# Patient Record
Sex: MALE | Race: WHITE | NOT HISPANIC OR LATINO | Employment: OTHER | URBAN - NONMETROPOLITAN AREA
[De-identification: names, ages, dates, MRNs, and addresses within clinical notes are randomized per-mention and may not be internally consistent; named-entity substitution may affect disease eponyms.]

---

## 2019-08-14 PROCEDURE — 99283 EMERGENCY DEPT VISIT LOW MDM: CPT

## 2019-08-15 ENCOUNTER — APPOINTMENT (OUTPATIENT)
Dept: CT IMAGING | Facility: HOSPITAL | Age: 59
End: 2019-08-15

## 2019-08-15 ENCOUNTER — APPOINTMENT (OUTPATIENT)
Dept: GENERAL RADIOLOGY | Facility: HOSPITAL | Age: 59
End: 2019-08-15

## 2019-08-15 ENCOUNTER — HOSPITAL ENCOUNTER (EMERGENCY)
Facility: HOSPITAL | Age: 59
Discharge: HOME | End: 2019-08-15
Attending: EMERGENCY MEDICINE | Admitting: EMERGENCY MEDICINE

## 2019-08-15 VITALS
BODY MASS INDEX: 32.28 KG/M2 | TEMPERATURE: 98 F | SYSTOLIC BLOOD PRESSURE: 137 MMHG | DIASTOLIC BLOOD PRESSURE: 76 MMHG | HEART RATE: 76 BPM | WEIGHT: 213 LBS | OXYGEN SATURATION: 92 % | HEIGHT: 68 IN | RESPIRATION RATE: 20 BRPM

## 2019-08-15 DIAGNOSIS — S61.221A LACERATION OF LEFT INDEX FINGER WITH FOREIGN BODY WITHOUT DAMAGE TO NAIL, INITIAL ENCOUNTER: ICD-10-CM

## 2019-08-15 DIAGNOSIS — T07.XXXA MULTIPLE CONTUSIONS: ICD-10-CM

## 2019-08-15 DIAGNOSIS — S42.102A CLOSED FRACTURE OF LEFT SCAPULA, UNSPECIFIED PART OF SCAPULA, INITIAL ENCOUNTER: Primary | ICD-10-CM

## 2019-08-15 DIAGNOSIS — S06.0X1A CONCUSSION WITH LOSS OF CONSCIOUSNESS OF 30 MINUTES OR LESS, INITIAL ENCOUNTER: ICD-10-CM

## 2019-08-15 PROCEDURE — 25010000002 TDAP 5-2.5-18.5 LF-MCG/0.5 SUSPENSION: Performed by: EMERGENCY MEDICINE

## 2019-08-15 PROCEDURE — 74176 CT ABD & PELVIS W/O CONTRAST: CPT

## 2019-08-15 PROCEDURE — 73590 X-RAY EXAM OF LOWER LEG: CPT

## 2019-08-15 PROCEDURE — 90471 IMMUNIZATION ADMIN: CPT | Performed by: EMERGENCY MEDICINE

## 2019-08-15 PROCEDURE — 72128 CT CHEST SPINE W/O DYE: CPT

## 2019-08-15 PROCEDURE — 72131 CT LUMBAR SPINE W/O DYE: CPT

## 2019-08-15 PROCEDURE — 73130 X-RAY EXAM OF HAND: CPT

## 2019-08-15 PROCEDURE — 73502 X-RAY EXAM HIP UNI 2-3 VIEWS: CPT

## 2019-08-15 PROCEDURE — 71250 CT THORAX DX C-: CPT

## 2019-08-15 PROCEDURE — 73564 X-RAY EXAM KNEE 4 OR MORE: CPT

## 2019-08-15 PROCEDURE — 90715 TDAP VACCINE 7 YRS/> IM: CPT | Performed by: EMERGENCY MEDICINE

## 2019-08-15 PROCEDURE — 72125 CT NECK SPINE W/O DYE: CPT

## 2019-08-15 PROCEDURE — 73030 X-RAY EXAM OF SHOULDER: CPT

## 2019-08-15 PROCEDURE — 70450 CT HEAD/BRAIN W/O DYE: CPT

## 2019-08-15 RX ORDER — HYDROCODONE BITARTRATE AND ACETAMINOPHEN 5; 325 MG/1; MG/1
1 TABLET ORAL EVERY 6 HOURS PRN
Qty: 12 TABLET | Refills: 0 | Status: SHIPPED | OUTPATIENT
Start: 2019-08-15 | End: 2019-08-19 | Stop reason: SDUPTHER

## 2019-08-15 RX ORDER — HYDROCODONE BITARTRATE AND ACETAMINOPHEN 10; 325 MG/1; MG/1
1 TABLET ORAL ONCE
Status: COMPLETED | OUTPATIENT
Start: 2019-08-15 | End: 2019-08-15

## 2019-08-15 RX ADMIN — HYDROCODONE BITARTRATE AND ACETAMINOPHEN 1 TABLET: 10; 325 TABLET ORAL at 02:26

## 2019-08-15 RX ADMIN — TETANUS TOXOID, REDUCED DIPHTHERIA TOXOID AND ACELLULAR PERTUSSIS VACCINE, ADSORBED 0.5 ML: 5; 2.5; 8; 8; 2.5 SUSPENSION INTRAMUSCULAR at 02:27

## 2019-08-15 NOTE — DISCHARGE INSTRUCTIONS
Please return with new or worsening symptoms.  Follow-up with orthopedics.  He gets medication filled and take as directed as needed.  Medication may be addictive and may cause drowsiness.  Only take as directed and do not drive or operate machinery while taking this medication.
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
## 2019-08-16 ENCOUNTER — OFFICE VISIT (OUTPATIENT)
Dept: ORTHOPEDIC SURGERY | Facility: CLINIC | Age: 59
End: 2019-08-16

## 2019-08-16 VITALS — BODY MASS INDEX: 32.28 KG/M2 | HEIGHT: 68 IN | WEIGHT: 213 LBS

## 2019-08-16 DIAGNOSIS — M25.512 LEFT SHOULDER PAIN, UNSPECIFIED CHRONICITY: Primary | ICD-10-CM
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
## 2019-08-16 DIAGNOSIS — S42.112A CLOSED DISPLACED FRACTURE OF BODY OF LEFT SCAPULA, INITIAL ENCOUNTER: ICD-10-CM

## 2019-08-16 PROCEDURE — 99203 OFFICE O/P NEW LOW 30 MIN: CPT | Performed by: ORTHOPAEDIC SURGERY

## 2019-08-16 NOTE — PROGRESS NOTES
Crow Moffett is a 59 y.o. male   Primary provider:  Provider, No Known       Chief Complaint   Patient presents with   • Left Shoulder - Pain       HISTORY OF PRESENT ILLNESS: Patient is here today for left shoulder pain. Patient fell off a 20-30 foot scaffolding on 8/14/2019. Patient was seen at [facility] ER and xrays were obtained. Patient states that his pain today is 8/10.  He was seen in the ER and had multiple x-rays and CT scans and noted to have a scapular fracture.  He said previous injuries to the left arm take care of by Dr. Ching had ORIF of his distal humerus apparently had some nerve injury is had some fingers fused in his left hand and he is essentially has a helping him.  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    History of Present Illness     CONCURRENT MEDICAL HISTORY:    Past Medical History:   Diagnosis Date   • Acute maxillary sinusitis    • Closed fracture of distal end of ulna    • Closed fracture of humerus    • Edema of upper extremity     left upper arm with redness    • Hoarse    • Polyuria    • Shoulder pain    • Slow urinary stream        No Known Allergies      Current Outpatient Medications:   •  HYDROcodone-acetaminophen (NORCO) 5-325 MG per tablet, Take 1 tablet by mouth Every 6 (Six) Hours As Needed for Moderate Pain  for up to 3 days., Disp: 12 tablet, Rfl: 0  •  [START ON 11/27/2106] ipratropium-albuterol (DUO-NEB) 0.5-2.5 mg/mL nebulizer, Take 3 mL by nebulization Every 4 (Four) Hours As Needed for wheezing., Disp: , Rfl:     Past Surgical History:   Procedure Laterality Date   • HUMERUS SURGERY  05/30/2013    Anesth, humerus surgery (OPen reduction and internal fixation with ulnar nerve transposition with triceps reflection. Procedure done under fluoroscopic control; final x-rays AP and oblique of the elbow maintained in medical record.)   • HUMERUS SURGERY  05/11/2013    Anesth, humerus surgery (Left humerus open reduction  "and internal fixation. Fractured right ulna distal shaft. fractured left humeral shaft.Procedure done under fluoroscopic control with multiple x-rays, fluoroscopy throughout the procedure.)   • INJECTION OF MEDICATION  08/12/2010    Kenalog (1)      • OTHER SURGICAL HISTORY  02/27/2014    Anesth, elbow area surgery (Removal of wire, screw and washer of the elbow. ECRB pronator teres transfer. Long & ring sublimis transfers to extensor tendons of thumb, index, long, ring & small-ring sublimis to long, ring & small fingers & long sublimis to thumb & index.)       No family history on file.     Social History     Socioeconomic History   • Marital status:      Spouse name: Not on file   • Number of children: Not on file   • Years of education: Not on file   • Highest education level: Not on file   Tobacco Use   • Smoking status: Never Smoker   Substance and Sexual Activity   • Alcohol use: Yes        Review of Systems   Constitutional: Negative.  Negative for chills and fever.   HENT: Positive for hearing loss. Negative for facial swelling.    Eyes: Negative.  Negative for photophobia.   Respiratory: Positive for shortness of breath. Negative for apnea.    Cardiovascular: Negative.  Negative for chest pain and leg swelling.   Gastrointestinal: Negative.  Negative for abdominal pain, nausea and vomiting.   Endocrine: Negative.    Genitourinary: Positive for hematuria. Negative for dysuria.   Musculoskeletal: Negative.    Skin: Negative.  Negative for color change and rash.   Allergic/Immunologic: Negative.    Neurological: Negative.  Negative for seizures and syncope.   Hematological: Negative.    Psychiatric/Behavioral: Positive for sleep disturbance. Negative for behavioral problems and dysphoric mood.       PHYSICAL EXAMINATION:       Ht 172.7 cm (68\")   Wt 96.6 kg (213 lb)   BMI 32.39 kg/m²     Physical Exam   Constitutional: He is oriented to person, place, and time. He appears well-developed. No distress. "   HENT:   Head: Normocephalic.   Bruising ecchymosis on the left side of his head and forehead   Eyes: EOM are normal. Pupils are equal, round, and reactive to light.   Neck: Neck supple. No tracheal deviation present.   Pulmonary/Chest: Effort normal.   Musculoskeletal: He exhibits tenderness. He exhibits no edema or deformity.   Neurological: He is alert and oriented to person, place, and time. A sensory deficit is present.   Skin: Skin is warm and dry. No erythema.   Psychiatric: He has a normal mood and affect.       GAIT:     []  Normal  [x]  Antalgic    Assistive device: []  None  []  Walker     []  Crutches  []  Cane     []  Wheelchair  []  Stretcher    Ortho Exam  Well-healed scars very decreased motion of the shoulder secondary to pain some numbness in his fingers unable to make a fist ( chronic problem) very tender and bruising about the scapula motion is decreased    Ct Abdomen Pelvis Without Contrast    Result Date: 8/15/2019  Narrative: CT chest, abdomen and pelvis without contrast on 8/15/2019 CLINICAL INDICATION: Fell 20 feet, generalized abdominal pain, per protocol for mechanism of injury TECHNIQUE: Multiple axial images are obtained throughout the chest, abdomen and pelvis without the administration of contrast. This exam was performed according to our departmental dose-optimization program, which includes automated exposure control, adjustment of the mA and/or kV according to patient size and/or use of iterative reconstruction technique. Total DLP is 921 mGy*cm. COMPARISON: Chest CT from 11/27/2016 FINDINGS: Of note, evaluation of a trauma patient without contrast is limited. CHEST: Mild coronary artery calcifications and other vascular calcifications are noted. There is elevation of the left hemidiaphragm. There is trace right pleural effusion. There is no left pleural effusion or pericardial effusion. There is mild bibasilar atelectasis. The lungs are otherwise clear. There is no thoracic  adenopathy. There is an acute, oblique, displaced fracture through the left upper body of the scapula. Screws are noted in the left proximal humerus. Old left rib fractures are noted. No other acute bony abnormality of the thorax is noted. ABDOMEN: There are nonobstructing left renal stones. The unenhanced solid abdominal organs are otherwise unremarkable. There is no abdominal adenopathy. Vascular calcifications are noted. There is no free fluid or free air within the abdomen. There is diverticulosis. The abdominal portion of the GI tract is otherwise unremarkable. Pelvis: There is mild diverticulosis. There is no free fluid in the pelvis. There are small left greater than right inguinal hernias containing only fat. There is no pelvic adenopathy. Intrahepatic portion of the GI tract including the appendix is otherwise unremarkable. No acute bony abnormality is noted.     Impression: 1. Acute displaced left scapular fracture. 2. No other evidence of acute traumatic injury in the chest, abdomen or pelvis by unenhanced imaging. Electronically signed by:  Jose M Mirza  8/15/2019 1:55 AM CDT Workstation: [id number]    Xr Hand 3+ View Left    Result Date: 8/15/2019  Narrative: Left hand three view on 8/15/2019 CLINICAL INDICATION: Fell 20 feet, pain, laceration COMPARISON: None FINDINGS: There is diffuse osteopenia. There is an old healed fracture of the distal radius. There is no radiopaque foreign body. There are no acute fractures. There is no dislocation.     Impression: No acute bony abnormality. Electronically signed by:  Jose M Mirza  8/15/2019 1:44 AM CDT Workstation: [id number]    Xr Knee 4+ View Left    Result Date: 8/15/2019  Narrative: Left knee four view on 8/15/2019 CLINICAL INDICATION: Fell 20 feet, pain COMPARISON: None FINDINGS: No joint effusion is noted. There are no fractures. Visualized joints are well aligned. No bony abnormality is noted.     Impression: No acute abnormality. Electronically  signed by:  Jose M Mirza  8/15/2019 1:46 AM CDT Workstation: [id number]    Xr Tibia Fibula 2 View Left    Result Date: 8/15/2019  Narrative: Left tibia fibula two view on 8/15/2019 CLINICAL INDICATION: Fell 20 feet, pain, laceration COMPARISON: None FINDINGS: There is old cerclage wire fixation in the distal fibula fixating an old healed fracture. There is no radiopaque foreign body. There are no acute fractures. Visualized joints are well aligned.     Impression: No acute abnormality. Electronically signed by:  Jose M Mirza  8/15/2019 1:45 AM CDT Workstation: [id number]    Ct Head Without Contrast    Result Date: 8/15/2019  Narrative: CT head without contrast on 8/15/2019 CLINICAL INDICATION: Fell 20 feet, bruising over left eye, concussion, pain TECHNIQUE: Multiple axial images are obtained throughout the head without the administration of contrast. This exam was performed according to our departmental dose-optimization program, which includes automated exposure control, adjustment of the mA and/or kV according to patient size and/or use of iterative reconstruction technique. Total DLP is 971.1 mGy*cm. COMPARISON: 10/9/2016 FINDINGS: There is no hydrocephalus. There is no CT evidence of acute infarct. There is no hemorrhage. There are no abnormal extra-axial fluid collections. There is no mass, mass effect or midline shift. There is a large likely mucous retention cyst in the left maxillary sinus. There is partial opacification of the right greater than left ethmoid sinuses. No bony abnormality is noted.     Impression: No acute intracranial abnormality. Electronically signed by:  Jose M Mirza  8/15/2019 1:14 AM CDT Workstation: [id number]    Ct Chest Without Contrast    Result Date: 8/15/2019  Narrative: CT chest, abdomen and pelvis without contrast on 8/15/2019 CLINICAL INDICATION: Fell 20 feet, generalized abdominal pain, per protocol for mechanism of injury TECHNIQUE: Multiple axial images are obtained  throughout the chest, abdomen and pelvis without the administration of contrast. This exam was performed according to our departmental dose-optimization program, which includes automated exposure control, adjustment of the mA and/or kV according to patient size and/or use of iterative reconstruction technique. Total DLP is 921 mGy*cm. COMPARISON: Chest CT from 11/27/2016 FINDINGS: Of note, evaluation of a trauma patient without contrast is limited. CHEST: Mild coronary artery calcifications and other vascular calcifications are noted. There is elevation of the left hemidiaphragm. There is trace right pleural effusion. There is no left pleural effusion or pericardial effusion. There is mild bibasilar atelectasis. The lungs are otherwise clear. There is no thoracic adenopathy. There is an acute, oblique, displaced fracture through the left upper body of the scapula. Screws are noted in the left proximal humerus. Old left rib fractures are noted. No other acute bony abnormality of the thorax is noted. ABDOMEN: There are nonobstructing left renal stones. The unenhanced solid abdominal organs are otherwise unremarkable. There is no abdominal adenopathy. Vascular calcifications are noted. There is no free fluid or free air within the abdomen. There is diverticulosis. The abdominal portion of the GI tract is otherwise unremarkable. Pelvis: There is mild diverticulosis. There is no free fluid in the pelvis. There are small left greater than right inguinal hernias containing only fat. There is no pelvic adenopathy. Intrahepatic portion of the GI tract including the appendix is otherwise unremarkable. No acute bony abnormality is noted.     Impression: 1. Acute displaced left scapular fracture. 2. No other evidence of acute traumatic injury in the chest, abdomen or pelvis by unenhanced imaging. Electronically signed by:  Jose M Mirza  8/15/2019 1:55 AM CDT Workstation: [id number]    Ct Cervical Spine Without  Contrast    Result Date: 8/15/2019  Narrative: CT cervical spine without contrast on 8/15/2019 CLINICAL INDICATION: Fell 20 feet, pain TECHNIQUE: Multiple axial images are obtained throughout the cervical spine without the administration of contrast. Sagittal and coronal reformatted images are also performed and reviewed. This exam was performed according to our departmental dose-optimization program, which includes automated exposure control, adjustment of the mA and/or kV according to patient size and/or use of iterative reconstruction technique. Total DLP is 403.3 mGy*cm. COMPARISON: None FINDINGS: There is reversal of the normal cervical lordosis. Degenerative facet disease is noted bilaterally in the upper cervical spine. There is grade 1 spondylolisthesis at C2-3 and C3-4 secondary to degenerative facet disease. Reformatted images reveal otherwise normal alignment of the cervical spine. Bilateral carotid calcifications are noted. There is partial fusion of the left upper ribs to the adjacent vertebral bodies. There are no acute fracture lines. No definite disc herniation is noted. There is no prevertebral soft tissue swelling.     Impression: Degenerative changes with no acute abnormality. Electronically signed by:  Jose M Mirza  8/15/2019 1:17 AM CDT Workstation: [id number]    Ct Thoracic Spine Without Contrast    Result Date: 8/15/2019  Narrative: CT thoracic spine without contrast on  8/15/2019 CLINICAL INDICATION: Pain after fell 20 feet TECHNIQUE: Multiple axial images are obtained throughout the thoracic spine without the administration of contrast. Sagittal and coronal reformatted images are also performed and reviewed. This exam was performed according to our departmental dose-optimization program, which includes automated exposure control, adjustment of the mA and/or kV according to patient size and/or use of iterative reconstruction technique. Total DLP is 1128.6 mGy*cm. COMPARISON: 11/27/2016  FINDINGS: There is stable grade 1 spondylolisthesis at T3-4 secondary to degenerative facet disease. Degenerative disc disease is noted in the upper thoracic spine. Reformatted images reveal otherwise normal alignment of the thoracic spine. Old rib fractures are noted. No acute fracture line is noted. No definite disc herniation is noted.     Impression: No acute abnormality. Electronically signed by:  Jose M Mirza  8/15/2019 1:58 AM CDT Workstation: [id number]    Ct Lumbar Spine Without Contrast    Result Date: 8/15/2019  Narrative: CT lumbar spine without contrast on  8/15/2019 CLINICAL INDICATION: Fell 20 feet, pain TECHNIQUE: Multiple axial images are obtained throughout the lumbar spine without the administration of contrast. Sagittal and coronal reformatted images are also performed and reviewed. This exam was performed according to our departmental dose-optimization program, which includes automated exposure control, adjustment of the mA and/or kV according to patient size and/or use of iterative reconstruction technique. Total DLP is 1112.7 mGy*cm. COMPARISON: None FINDINGS: Mild degenerative disc disease is noted worse in the upper lumbar spine. Reformatted images reveal normal alignment of the lumbar spine. Facet arthropathy is noted in the lower lumbar spine. There are no acute fracture lines. The SI joints are well aligned. There is a nonobstructing left renal stone. Vascular calcifications are noted. There is diverticulosis. There is a trace right pleural effusion. At the L1-2 level, broad-based disc bulge produces minimal canal stenosis. At the L4-5 level, broad-based disc bulge and facet arthropathy produces mild canal stenosis and mild bilateral foraminal narrowing. No definite disc herniations are noted.     Impression: Degenerative changes with no acute fracture or acute malalignment of the lumbar spine. Electronically signed by:  Jose M Mirza  8/15/2019 1:49 AM CDT Workstation:  [id number]    Xr Shoulder 2+ View Bilateral    Result Date: 8/15/2019  Narrative: Bilateral shoulders three view on 8/15/2019 CLINICAL INDICATION: Fell 20 feet, pain COMPARISON: CT on the same day FINDINGS: Left shoulder: Two screws are noted in the proximal humerus. There is partially imaged multiple plate and multiple screw fixation of the mid to distal humerus diaphysis. There has been apparent resection of the left distal clavicle. There is likely acute displaced left scapular fracture. Old rib fractures are noted. No other fracture is noted. Right shoulder: The glenohumeral joint is well located. The AC joint is well aligned. There are no fractures.     Impression: 1. No acute bony abnormality in the right shoulder. 2. Likely acute displaced left scapular fracture. Electronically signed by:  Jose M Mirza  8/15/2019 2:02 AM CDT Workstation: [id number]    Xr Hip With Or Without Pelvis 2 - 3 View Right    Result Date: 8/15/2019  Narrative: Pelvis and right hip total three view on 8/15/2019 CLINICAL INDICATION: Fell 20 feet, pain COMPARISON: CT on the same day FINDINGS: The hips are well located. The SI joints are well aligned. There are no fractures. No significant degenerative changes are noted in the hips.     Impression: No acute abnormality. Electronically signed by:  Jose M Mirza  8/15/2019 1:59 AM CDT Workstation: [id number]          ASSESSMENT:    Diagnoses and all orders for this visit:    Left shoulder pain, unspecified chronicity  -     CT shoulder left wo contrast; Future    Fall, initial encounter  -     CT shoulder left wo contrast; Future    Closed displaced fracture of body of left scapula, initial encounter          PLAN I think we need to get a CT scan of the scapula to evaluate this further.  He seems to have some pre-existing changes in the glenoid not much sure about this.  Most of the time these glenoid fractures or scapular fractures are treated nonoperatively explained this to him we will  see him back after CT scan which is noncontrasted.  He will use a sling for comfort    No Follow-up on file.        This document has been electronically signed by Pete Muñoz MD on August 16, 2019 9:26 AM

## 2019-08-19 ENCOUNTER — DOCUMENTATION (OUTPATIENT)
Dept: ORTHOPEDIC SURGERY | Facility: CLINIC | Age: 59
End: 2019-08-19

## 2019-08-19 RX ORDER — HYDROCODONE BITARTRATE AND ACETAMINOPHEN 5; 325 MG/1; MG/1
1 TABLET ORAL EVERY 6 HOURS PRN
Qty: 40 TABLET | Refills: 0 | Status: SHIPPED | OUTPATIENT
Start: 2019-08-19 | End: 2019-10-18

## 2019-08-19 NOTE — TELEPHONE ENCOUNTER
Patient seen by Dr. Garibay on 8/16/2019 in process of getting ct done. 15 pills given on 8/15/2019 from the ER.

## 2019-08-20 ENCOUNTER — HOSPITAL ENCOUNTER (OUTPATIENT)
Dept: CT IMAGING | Facility: HOSPITAL | Age: 59
Discharge: HOME | End: 2019-08-20
Admitting: ORTHOPAEDIC SURGERY

## 2019-08-20 ENCOUNTER — CLINICAL SUPPORT (OUTPATIENT)
Dept: AUDIOLOGY | Facility: CLINIC | Age: 59
End: 2019-08-20

## 2019-08-20 ENCOUNTER — OFFICE VISIT (OUTPATIENT)
Dept: OTOLARYNGOLOGY | Facility: CLINIC | Age: 59
End: 2019-08-20

## 2019-08-20 VITALS — HEIGHT: 68 IN | TEMPERATURE: 96.6 F | BODY MASS INDEX: 32.28 KG/M2 | WEIGHT: 213 LBS
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
## 2019-08-20 DIAGNOSIS — H68.133: ICD-10-CM

## 2019-08-20 DIAGNOSIS — Z87.828 HISTORY OF MOTOR VEHICLE ACCIDENT: ICD-10-CM

## 2019-08-20 DIAGNOSIS — M25.512 LEFT SHOULDER PAIN, UNSPECIFIED CHRONICITY: ICD-10-CM

## 2019-08-20 DIAGNOSIS — H93.19 TINNITUS, UNSPECIFIED LATERALITY: ICD-10-CM

## 2019-08-20 DIAGNOSIS — H91.8X3 OTHER SPECIFIED HEARING LOSS, BILATERAL: Primary | ICD-10-CM

## 2019-08-20 DIAGNOSIS — H74.01 TYMPANOSCLEROSIS INVOLVING TYMPANIC MEMBRANE ONLY, RIGHT: ICD-10-CM

## 2019-08-20 DIAGNOSIS — J34.2 NASAL SEPTAL DEVIATION: Primary | ICD-10-CM

## 2019-08-20 PROCEDURE — 73200 CT UPPER EXTREMITY W/O DYE: CPT

## 2019-08-20 PROCEDURE — 99204 OFFICE O/P NEW MOD 45 MIN: CPT | Performed by: OTOLARYNGOLOGY

## 2019-08-20 RX ORDER — AZELASTINE 1 MG/ML
2 SPRAY, METERED NASAL 2 TIMES DAILY
Qty: 30 ML | Refills: 12 | Status: SHIPPED | OUTPATIENT
Start: 2019-08-20 | End: 2019-10-22 | Stop reason: HOSPADM

## 2019-08-20 NOTE — PATIENT INSTRUCTIONS

## 2019-08-20 NOTE — PROGRESS NOTES
Subjective   Crow Moffett is a 59 y.o. male.   For ear problem  History of Present Illness   Comes in for an ear problem also has trouble breathing through his nose congestion his ear stopped up his long-standing hearing loss since 1985 had nasal surgery which is failed to improve his deviated septum the left side treated by another surgeon he has postnasal drip and congestion as well.  He had a recent fall has long-standing tinnitus had multiple head injuries was told he may have ruptured eardrum had abnormality of his tympanic membrane on the right comes with hearing loss after the fall but no vertigo      The following portions of the patient's history were reviewed and updated as appropriate: allergies, current medications, past family history, past medical history, past social history, past surgical history and problem list.      Crow Moffett reports that he has never smoked. He does not have any smokeless tobacco history on file. He reports that he drinks alcohol.  Patient is not a tobacco user and has not been counseled for use of tobacco products    History reviewed. No pertinent family history.      Current Outpatient Medications:   •  HYDROcodone-acetaminophen (NORCO) 5-325 MG per tablet, Take 1 tablet by mouth Every 6 (Six) Hours As Needed for Moderate Pain ., Disp: 40 tablet, Rfl: 0  •  [START ON 11/27/2106] ipratropium-albuterol (DUO-NEB) 0.5-2.5 mg/mL nebulizer, Take 3 mL by nebulization Every 4 (Four) Hours As Needed for wheezing., Disp: , Rfl:   •  azelastine (ASTELIN) 0.1 % nasal spray, 2 sprays into the nostril(s) as directed by provider 2 (Two) Times a Day. Use in each nostril as directed, Disp: 30 mL, Rfl: 12    No Known Allergies    Past Medical History:   Diagnosis Date   • Acute maxillary sinusitis    • Closed fracture of distal end of ulna    • Closed fracture of humerus    • Edema of upper extremity     left upper arm with redness    • Hoarse    • Polyuria    • Shoulder  pain    • Slow urinary stream        Past Surgical History:   Procedure Laterality Date   • HUMERUS SURGERY  05/30/2013    Anesth, humerus surgery (OPen reduction and internal fixation with ulnar nerve transposition with triceps reflection. Procedure done under fluoroscopic control; final x-rays AP and oblique of the elbow maintained in medical record.)   • HUMERUS SURGERY  05/11/2013    Anesth, humerus surgery (Left humerus open reduction and internal fixation. Fractured right ulna distal shaft. fractured left humeral shaft.Procedure done under fluoroscopic control with multiple x-rays, fluoroscopy throughout the procedure.)   • INJECTION OF MEDICATION  08/12/2010    Kenalog (1)      • OTHER SURGICAL HISTORY  02/27/2014    Anesth, elbow area surgery (Removal of wire, screw and washer of the elbow. ECRB pronator teres transfer. Long & ring sublimis transfers to extensor tendons of thumb, index, long, ring & small-ring sublimis to long, ring & small fingers & long sublimis to thumb & index.)       Review of Systems   Constitutional: Negative for fever.   HENT: Positive for hearing loss and tinnitus. Negative for ear discharge and ear pain.    Neurological: Positive for facial asymmetry. Negative for dizziness.   Hematological: Negative for adenopathy.   All other systems reviewed and are negative.          Objective   Physical Exam   Constitutional: He appears well-developed and well-nourished.   HENT:   Head: Normocephalic.   Right Ear: External ear normal. Tympanic membrane is scarred.   Ears:    Mouth/Throat: Oropharynx is clear and moist.   Eyes: Conjunctivae are normal.   Neck: Normal range of motion.   Pulmonary/Chest: Effort normal.   Neurological: He is alert.   Skin: Skin is warm.   Psychiatric: He has a normal mood and affect.         Audiogram shows bilateral sensorineural hearing loss high-frequency speech reception throat thresholds decreased on the right versus the left could be consistent with scarring  there is no evidence of effusion or perforation based on tympanogram actual tracings are shown to the patient    Assessment/Plan   Crow was seen today for other.    Diagnoses and all orders for this visit:    Nasal septal deviation    Eustachian tube obstruction, external cartilaginous, bilateral    Tympanosclerosis involving tympanic membrane only, right    Other orders  -     azelastine (ASTELIN) 0.1 % nasal spray; 2 sprays into the nostril(s) as directed by provider 2 (Two) Times a Day. Use in each nostril as directed      Yes he could consider surgery to remove some scar but still with significant sensorineural hearing loss which is a greater factor regarding his hearing then the small conductive component.  I am not sure the actual source of the scarring and will follow that over time but there is no need for immediate intervention no evidence of cholesteatoma or fluid.      Discussed this with the patient discussed using hearing aid needs to want to do hearing aid evaluation.    We also talked about his deviated septum and nasal obstruction will start him on antihistamine nasal spray and said that is more effective talked about possible surgical intervention because he failed his previous surgery from another surgeon is can think about that we will see him back in follow-up and all questions were answered  Discussed what was involved surgery and the risk benefits I do not think surgery is the best approach at this point will make that decision further as time goes on we will see him back in follow-up

## 2019-08-30 ENCOUNTER — OFFICE VISIT (OUTPATIENT)
Dept: ORTHOPEDIC SURGERY | Facility: CLINIC | Age: 59
End: 2019-08-30

## 2019-08-30 VITALS — WEIGHT: 213 LBS | HEIGHT: 68 IN | BODY MASS INDEX: 32.28 KG/M2

## 2019-08-30 DIAGNOSIS — M25.512 LEFT SHOULDER PAIN, UNSPECIFIED CHRONICITY: Primary | ICD-10-CM

## 2019-08-30 DIAGNOSIS — S42.112A CLOSED DISPLACED FRACTURE OF BODY OF LEFT SCAPULA, INITIAL ENCOUNTER: ICD-10-CM

## 2019-08-30 PROCEDURE — 23570 CLTX SCAPULAR FX W/O MNPJ: CPT | Performed by: ORTHOPAEDIC SURGERY

## 2019-08-30 PROCEDURE — 99213 OFFICE O/P EST LOW 20 MIN: CPT | Performed by: ORTHOPAEDIC SURGERY

## 2019-08-30 RX ORDER — TRAMADOL HYDROCHLORIDE 50 MG/1
50 TABLET ORAL EVERY 4 HOURS PRN
Qty: 30 TABLET | Refills: 0 | OUTPATIENT
Start: 2019-08-30 | End: 2019-10-18

## 2019-08-30 NOTE — PROGRESS NOTES
"Crow Moffett is a 59 y.o. male returns for     Chief Complaint   Patient presents with   • Left Shoulder - Follow-up   • Results     08/20/19  CT shoulder left wo contrast        HISTORY OF PRESENT ILLNESS: ct results left shoulder.  Pain scale today 8/10.  He is using a sling getting some function back.       CONCURRENT MEDICAL HISTORY:    The following portions of the patient's history were reviewed and updated as appropriate: allergies, current medications, past family history, past medical history, past social history, past surgical history and problem list.     ROS  No fevers or chills.  No chest pain or shortness of air.  No GI or  disturbances.  No cardiac issues noted.  All other systems are reported negative or without change.    PHYSICAL EXAMINATION:       Ht 172.7 cm (68\")   Wt 96.6 kg (213 lb)   BMI 32.39 kg/m²     Physical Exam   Constitutional: He is oriented to person, place, and time. He appears well-developed.   HENT:   Head: Normocephalic.   Bruising and ecchymosis on the left side of his face   Eyes: EOM are normal. Pupils are equal, round, and reactive to light.   Neck: Neck supple. No tracheal deviation present.   Pulmonary/Chest: Effort normal.   Musculoskeletal: Normal range of motion. He exhibits tenderness and deformity. He exhibits no edema.   Neurological: He is alert and oriented to person, place, and time.   Skin: Skin is warm and dry. No erythema.   Psychiatric: He has a normal mood and affect.       GAIT:     []  Normal  []  Antalgic    Assistive device: [x]  None  []  Walker     []  Crutches  []  Cane     []  Wheelchair  []  Stretcher    Ortho Exam  Limited motion well-healed scars of neurovascular dysfunction in his lower hand.  Very limited motion of the shoulder slightly tender about the scapular spine and acromion.  Decreased range of motion of the shoulder.  Study Result     CT left shoulder without contrast     HISTORY: Pain following trauma. Fell.     Nonenhanced " axial scans of the left shoulder were obtained.  Sagittal and coronal reconstructions were performed.     Correlation: Radiographs August 16, 2019.     This exam was performed according to our departmental  dose-optimization program, which includes automated exposure  control, adjustment of the mA and/or kV according to patient size  and/or use of iterative reconstruction technique.     CT DLP: 651.60     Findings:  Longitudinally oriented displaced and overriding fracture lateral  aspect of the scapula extending from the supraspinatus portion to  the mid to lower body.  Fracture involves the coracoid process as well.  No dislocation of the humeral head.  Two screws in place in the humeral head.  Degenerative changes humeral head.  Internal fixation plate and screw device in place in the humeral  shaft.     IMPRESSION:  CONCLUSION:  Left scapular fracture.     He has some displacement of the scapular body the glenohumeral joint appears intact there are degenerative change of the humeral head and flattening.  There is irregularity of the glenoid but no fracture here.  Actually the body is somewhat posterior displaced relative to the glenoid and the main portion.          ASSESSMENT:    Diagnoses and all orders for this visit:    Left shoulder pain, unspecified chronicity    Closed displaced fracture of body of left scapula, initial encounter          PLAN of gone over the CT scan with Dr. Valentine we discussed treatment for this.  I think the patient has an underlying degenerative shoulder with likely rotator cuff arthropathy.  I really think after discussing this not much to do from a operative standpoint to let this heal on medicine with physical therapy check him back repeat an x-ray of the scapula in a month.    No Follow-up on file.        This document has been electronically signed by Tanya Pierce CSA on August 30, 2019 8:57 AM

## 2019-09-05 ENCOUNTER — HOSPITAL ENCOUNTER (OUTPATIENT)
Dept: PHYSICAL THERAPY | Facility: HOSPITAL | Age: 59
Setting detail: THERAPIES SERIES
Discharge: HOME | End: 2019-09-05

## 2019-09-05 DIAGNOSIS — M25.512 LEFT SHOULDER PAIN, UNSPECIFIED CHRONICITY: Primary | ICD-10-CM

## 2019-09-05 DIAGNOSIS — S42.112A CLOSED DISPLACED FRACTURE OF BODY OF LEFT SCAPULA, INITIAL ENCOUNTER: ICD-10-CM

## 2019-09-05 PROCEDURE — 97110 THERAPEUTIC EXERCISES: CPT | Performed by: PHYSICAL THERAPIST

## 2019-09-05 PROCEDURE — 97162 PT EVAL MOD COMPLEX 30 MIN: CPT | Performed by: PHYSICAL THERAPIST

## 2019-09-05 NOTE — THERAPY EVALUATION
Outpatient Physical Therapy Ortho Initial Evaluation  [facility]     Patient Name: Crow Moffett  : 1960  MRN: [id number]  Today's Date: 2019      Visit Date: 2019  Visit   Return to MD: KENDALL  Re-cert date: 19  Patient Active Problem List   Diagnosis   • Left shoulder pain   • Closed displaced fracture of body of left scapula        Past Medical History:   Diagnosis Date   • Acute maxillary sinusitis    • Closed fracture of distal end of ulna    • Closed fracture of humerus    • Edema of upper extremity     left upper arm with redness    • Hoarse    • Polyuria    • Shoulder pain    • Slow urinary stream         Past Surgical History:   Procedure Laterality Date   • HUMERUS SURGERY  2013    Anesth, humerus surgery (OPen reduction and internal fixation with ulnar nerve transposition with triceps reflection. Procedure done under fluoroscopic control; final x-rays AP and oblique of the elbow maintained in medical record.)   • HUMERUS SURGERY  2013    Anesth, humerus surgery (Left humerus open reduction and internal fixation. Fractured right ulna distal shaft. fractured left humeral shaft.Procedure done under fluoroscopic control with multiple x-rays, fluoroscopy throughout the procedure.)   • INJECTION OF MEDICATION  2010    Kenalog (1)      • OTHER SURGICAL HISTORY  2014    Anesth, elbow area surgery (Removal of wire, screw and washer of the elbow. ECRB pronator teres transfer. Long & ring sublimis transfers to extensor tendons of thumb, index, long, ring & small-ring sublimis to long, ring & small fingers & long sublimis to thumb & index.)       Visit Dx:     ICD-10-CM ICD-9-CM   1. Left shoulder pain, unspecified chronicity M25.512 719.41   2. Closed displaced fracture of body of left scapula, initial encounter S42.112A 811.09     Medications (Admitted on 2019)     azelastine (ASTELIN) 0.1 % nasal spray     HYDROcodone-acetaminophen (NORCO) 5-325  MG per tablet     ipratropium-albuterol (DUO-NEB) 0.5-2.5 mg/mL nebulizer     traMADol (ULTRAM) 50 MG tablet      Allergies: NKA      PT Ortho     Row Name 09/05/19 1500       Subjective Comments    Subjective Comments  60 yo male with acute L shoulder pain after falling off a 16 ft scaffold and fractured his L scapula 2 weeks ago.   -BS       Precautions and Contraindications    Precautions/Limitations  no known precautions/limitations  -BS       Subjective Pain    Able to rate subjective pain?  yes  -BS    Pre-Treatment Pain Level  5  -BS    Post-Treatment Pain Level  5  -BS       General ROM    GENERAL ROM COMMENTS  PROM: L shoulder-flex 91 deg abd 55 deg ER 18 deg IR 75 deg L elbow 0-118 deg  -BS       MMT (Manual Muscle Testing)    General MMT Comments  MMT: R shoulder-flex 4/5 abd 4/5 ER 5/5 IR 5/5 L shoulder-flex 3+/5 abd 3+/5 ER 4/5 IR 5/5 L elbow-flex 5/5 ext 5/5   -BS       Sensation    Sensation WNL?  WNL  -BS      User Key  (r) = Recorded By, (t) = Taken By, (c) = Cosigned By    Initials Name Provider Type    Masood Allen, PT Physical Therapist                      Therapy Education  Education Details: HEP: see flow sheet  Given: HEP  Program: New  How Provided: Verbal  Provided to: Patient  Level of Understanding: Verbalized, Demonstrated     PT OP Goals     Row Name 09/05/19 1500          PT Short Term Goals    STG Date to Achieve  09/19/19  -BS     STG 1  Pt indep with HEP   -BS     STG 1 Progress  New  -BS     STG 2  Improve L shoulder MMT to 3+/5  -BS     STG 2 Progress  New  -BS     STG 3  Improve L shoulder abduction AAROM to 90°  -BS     STG 3 Progress  New  -BS        Long Term Goals    LTG Date to Achieve  10/03/19  -BS     LTG 1  Improve L shoulder abduction AROM to 120°  -BS     LTG 1 Progress  New  -BS     LTG 2  Improve L shoulder MMT to 4/5  -BS     LTG 2 Progress  New  -BS     LTG 3  Reduce L scapula/L shoulder pain by 50%  -BS     LTG 3 Progress  New  -BS     LTG 4  Improve L  shoulder ER AROM to 40°  -BS     LTG 4 Progress  New  -BS        Time Calculation    PT Goal Re-Cert Due Date  09/26/19  -BS       User Key  (r) = Recorded By, (t) = Taken By, (c) = Cosigned By    Initials Name Provider Type    Masood Allen, PT Physical Therapist          PT Assessment/Plan     Row Name 09/05/19 1521          PT Assessment    Functional Limitations  Performance in self-care ADL;Performance in sport activities;Performance in work activities;Performance in leisure activities;Limitation in home management  -BS     Impairments  Range of motion;Posture;Pain;Muscle strength  -BS     Assessment Comments  Acute L shoulder region pain s/p scapula fracture.  -BS     Please refer to paper survey for additional self-reported information  Yes  -BS     Rehab Potential  Good  -BS     Patient/caregiver participated in establishment of treatment plan and goals  Yes  -BS     Patient would benefit from skilled therapy intervention  Yes  -BS        PT Plan    PT Frequency  2x/week  -BS     Predicted Duration of Therapy Intervention (Therapy Eval)  6-8 weeks  -BS     Planned CPT's?  PT EVAL MOD COMPLELITY: 33111;PT RE-EVAL: 85117;PT THER PROC EA 15 MIN: 44158;PT MANUAL THERAPY EA 15 MIN: 83838;PT HOT OR COLD PACK TREAT MCARE;PT ELECTRICAL STIM UNATTEND: ;PT ULTRASOUND EA 15 MIN: 59597;PT THER SUPP EA 15 MIN  -BS     Physical Therapy Interventions (Optional Details)  home exercise program;joint mobilization;manual therapy techniques;modalities;patient/family education;ROM (Range of Motion);strengthening;stretching  -BS     PT Plan Comments  clarify with Dr. Muñoz before initiating poc.  -BS       User Key  (r) = Recorded By, (t) = Taken By, (c) = Cosigned By    Initials Name Provider Type    Masood Allen, PT Physical Therapist            OP Exercises     Row Name 09/05/19 1500             Subjective Comments    Subjective Comments  60 yo male with acute L shoulder pain after falling off a 16 ft  scaffold and fractured his L scapula 2 weeks ago.   -BS         Subjective Pain    Able to rate subjective pain?  yes  -BS      Pre-Treatment Pain Level  5  -BS      Post-Treatment Pain Level  5  -BS         Exercise 1    Exercise Name 1  pendulums: fwd/bwd w/ L shoulder  -BS      Sets 1  1  -BS      Reps 1  20 ea  -BS         Exercise 2    Exercise Name 2  scapular retractions  -BS      Sets 2  1  -BS      Reps 2  20   -BS         Exercise 3    Exercise Name 3  shoulder shrugs  -BS      Sets 3  1  -BS      Reps 3  20  -BS        User Key  (r) = Recorded By, (t) = Taken By, (c) = Cosigned By    Initials Name Provider Type    Masood Allen, PT Physical Therapist                        Outcome Measure Options: Quick DASH  Quick DASH  Open a tight or new jar.: Unable  Do heavy household chores (e.g., wash walls, wash floors): Unable  Carry a shopping bag or briefcase: Unable  Wash your back: Unable  Use a knife to cut food: Unable  Recreational activities in which you take some force or impact through your arm, should or hand (e.g. golf, hammering, tennis, etc.): Unable  During the past week, to what extent has your arm, shoulder, or hand problem interfered with your normal social activites with family, friends, neighbors or groups?: Extremely  During the past week, were you limited in your work or other regular daily activities as a result of your arm, shoulder or hand problem?: Unable  Arm, Shoulder, or hand pain: Severe  Tingling (pins and needles) in your arm, shoulder, or hand: Extreme  During the past week, how much difficulty have you had sleeping because of the pain in your arm, shoulder or hand?: So much Difficulty that I can't sleep  Number of Questions Answered: 11  Quick DASH Score: 97.73         Time Calculation:     Start Time: 1521  Stop Time: 1605  Time Calculation (min): 44 min  Total Timed Code Minutes- PT: 44 minute(s)     Therapy Charges for Today     Code Description Service Date Service  Provider Modifiers Qty    67518849334 HC PT EVAL MOD COMPLEXITY 2 9/5/2019 Masood Vargas, PT GP 1    94228358273 HC PT THER PROC EA 15 MIN 9/5/2019 Masood Vargas, PT GP 1          PT G-Codes  Outcome Measure Options: Quick DASH  Quick DASH Score: 97.73         Masood Vargas, PT  9/5/2019

## 2019-09-11 ENCOUNTER — HOSPITAL ENCOUNTER (OUTPATIENT)
Dept: PHYSICAL THERAPY | Facility: HOSPITAL | Age: 59
Setting detail: THERAPIES SERIES
Discharge: HOME | End: 2019-09-11

## 2019-09-11 DIAGNOSIS — S42.112A CLOSED DISPLACED FRACTURE OF BODY OF LEFT SCAPULA, INITIAL ENCOUNTER: ICD-10-CM

## 2019-09-11 DIAGNOSIS — M25.512 LEFT SHOULDER PAIN, UNSPECIFIED CHRONICITY: Primary | ICD-10-CM

## 2019-09-11 NOTE — THERAPY TREATMENT NOTE
Outpatient Physical Therapy Ortho Treatment Note  [facility]     Patient Name: Crow Moffett  : 1960  MRN: [id number]  Today's Date: 2019      Visit Date: 2019    Visit Dx:    ICD-10-CM ICD-9-CM   1. Left shoulder pain, unspecified chronicity M25.512 719.41   2. Closed displaced fracture of body of left scapula, initial encounter S42.112A 811.09       Patient Active Problem List   Diagnosis   • Left shoulder pain   • Closed displaced fracture of body of left scapula        Past Medical History:   Diagnosis Date   • Acute maxillary sinusitis    • Closed fracture of distal end of ulna    • Closed fracture of humerus    • Edema of upper extremity     left upper arm with redness    • Hoarse    • Polyuria    • Shoulder pain    • Slow urinary stream         Past Surgical History:   Procedure Laterality Date   • HUMERUS SURGERY  2013    Anesth, humerus surgery (OPen reduction and internal fixation with ulnar nerve transposition with triceps reflection. Procedure done under fluoroscopic control; final x-rays AP and oblique of the elbow maintained in medical record.)   • HUMERUS SURGERY  2013    Anesth, humerus surgery (Left humerus open reduction and internal fixation. Fractured right ulna distal shaft. fractured left humeral shaft.Procedure done under fluoroscopic control with multiple x-rays, fluoroscopy throughout the procedure.)   • INJECTION OF MEDICATION  2010    Kenalog (1)      • OTHER SURGICAL HISTORY  2014    Anesth, elbow area surgery (Removal of wire, screw and washer of the elbow. ECRB pronator teres transfer. Long & ring sublimis transfers to extensor tendons of thumb, index, long, ring & small-ring sublimis to long, ring & small fingers & long sublimis to thumb & index.)       PT Ortho     Row Name 19 1500       General ROM    GENERAL ROM COMMENTS  PROM: L shoulder-flex 100 deg abd 90 deg ER 10 deg @ 45 abduction, IR 45 deg @ 45 degrees  "abduction, L elbow 0-118 deg  -SW      User Key  (r) = Recorded By, (t) = Taken By, (c) = Cosigned By    Initials Name Provider Type    Teri Beard Physical Therapist                      PT Assessment/Plan     Row Name 09/11/19 1600          PT Assessment    Functional Limitations  Performance in self-care ADL;Decreased safety during functional activities  -     Impairments  Joint mobility;Muscle strength;Pain  -SW     Assessment Comments  Patient exhibite improvement in left shoulder overhead passive motion including abduction and flexion. Start on arm bike next visit.  -SW        PT Plan    PT Plan Comments  Per email from physician no precautions  -SW       User Key  (r) = Recorded By, (t) = Taken By, (c) = Cosigned By    Initials Name Provider Type    Teri Beard Physical Therapist            OP Exercises     Row Name 09/11/19 1500             Subjective Pain    Able to rate subjective pain?  yes  -SW      Pre-Treatment Pain Level  6  -SW      Post-Treatment Pain Level  5  -SW         Exercise 1    Exercise Name 1  PROM see manual   -SW         Exercise 2    Exercise Name 2  supine flex w tube  -SW      Reps 2  10x5\"  -SW         Exercise 3    Exercise Name 3  supine punches w tube  -SW      Reps 3  20  -SW         Exercise 4    Exercise Name 4  ER w tube  -SW      Reps 4  10x5\"  -SW         Exercise 5    Exercise Name 5  extension w tube  -SW      Reps 5  10x5\"  -SW         Exercise 6    Exercise Name 6  IR w tube  -SW      Reps 6  10x5\"  -SW         Exercise 7    Exercise Name 7  backward shoulder rolls  -SW      Reps 7  x20  -SW        User Key  (r) = Recorded By, (t) = Taken By, (c) = Cosigned By    Initials Name Provider Type    Teri Beard Physical Therapist                      Manual Rx (last 36 hours)      Manual Treatments     Row Name 09/11/19 1500             Manual Rx 1    Manual Rx 1 Location  Left shoulder PROM all directions  -SW      Manual Rx 1 Duration  12'  -SW        User " Key  (r) = Recorded By, (t) = Taken By, (c) = Cosigned By    Initials Name Provider Type    Teri Beard Physical Therapist          PT OP Goals     Row Name 09/11/19 1600 09/11/19 1500       PT Short Term Goals    STG Date to Achieve  --  09/19/19  -    STG 1  --  Pt indep with HEP   -    STG 2  --  Improve L shoulder MMT to 3+/5  -SW    STG 3  --  Improve L shoulder abduction AAROM to 90°  -       Long Term Goals    LTG Date to Achieve  --  10/03/19  -    LTG 1  --  Improve L shoulder abduction AROM to 120°  -    LTG 2  --  Improve L shoulder MMT to 4/5  -SW    LTG 3  --  Reduce L scapula/L shoulder pain by 50%  -    LTG 4  --  Improve L shoulder ER AROM to 40°  -       Time Calculation    PT Goal Re-Cert Due Date  09/26/19  -SW  09/26/19  -      User Key  (r) = Recorded By, (t) = Taken By, (c) = Cosigned By    Initials Name Provider Type    Teri Beard Physical Therapist                         Time Calculation:   Start Time: 1515  Stop Time: 1545  Time Calculation (min): 30 min                Teri Arguello  9/11/2019

## 2019-09-13 ENCOUNTER — HOSPITAL ENCOUNTER (OUTPATIENT)
Dept: PHYSICAL THERAPY | Facility: HOSPITAL | Age: 59
Setting detail: THERAPIES SERIES
Discharge: HOME | End: 2019-09-13

## 2019-09-13 DIAGNOSIS — M25.512 LEFT SHOULDER PAIN, UNSPECIFIED CHRONICITY: Primary | ICD-10-CM

## 2019-09-13 DIAGNOSIS — S42.112A CLOSED DISPLACED FRACTURE OF BODY OF LEFT SCAPULA, INITIAL ENCOUNTER: ICD-10-CM

## 2019-09-13 PROCEDURE — 97110 THERAPEUTIC EXERCISES: CPT | Performed by: PHYSICAL THERAPIST

## 2019-09-13 NOTE — THERAPY TREATMENT NOTE
Outpatient Physical Therapy Ortho Treatment Note  [facility]     Patient Name: Crow Moffett  : 1960  MRN: [id number]  Today's Date: 2019      Visit Date: 2019  Attendance: 3/3 (15/yr on secondary)  Subjective Improvement: unsure  Next MD Appt: 19  Recert Date: 19    Therapy Diagnosis:    Visit Dx:    ICD-10-CM ICD-9-CM   1. Left shoulder pain, unspecified chronicity M25.512 719.41   2. Closed displaced fracture of body of left scapula, initial encounter S42.112A 811.09            Past Medical History:   Diagnosis Date   • Acute maxillary sinusitis    • Closed fracture of distal end of ulna    • Closed fracture of humerus    • Edema of upper extremity     left upper arm with redness    • Hoarse    • Polyuria    • Shoulder pain    • Slow urinary stream         Past Surgical History:   Procedure Laterality Date   • HUMERUS SURGERY  2013    Anesth, humerus surgery (OPen reduction and internal fixation with ulnar nerve transposition with triceps reflection. Procedure done under fluoroscopic control; final x-rays AP and oblique of the elbow maintained in medical record.)   • HUMERUS SURGERY  2013    Anesth, humerus surgery (Left humerus open reduction and internal fixation. Fractured right ulna distal shaft. fractured left humeral shaft.Procedure done under fluoroscopic control with multiple x-rays, fluoroscopy throughout the procedure.)   • INJECTION OF MEDICATION  2010    Kenalog (1)      • OTHER SURGICAL HISTORY  2014    Anesth, elbow area surgery (Removal of wire, screw and washer of the elbow. ECRB pronator teres transfer. Long & ring sublimis transfers to extensor tendons of thumb, index, long, ring & small-ring sublimis to long, ring & small fingers & long sublimis to thumb & index.)       PT Ortho     Row Name 19 1500       General ROM    LT Upper Ext  --  -SS    Row Name 19 1400       Subjective Comments    Subjective Comments   Pain is better than last therapy sessions. Has taken it easier the past few days. Grinding and sharp pain in the shoulder. Constant burning in both scapulae, which is starting to ease up a bit. Only sleeping 4-6 hours.   -       Subjective Pain    Post-Treatment Pain Level  5  -SS       General ROM    LT Upper Ext  Lt Shoulder Extension;Lt Shoulder ABduction;Lt Shoulder Flexion  -       Left Upper Ext    Lt Shoulder Abduction AROM  65  -SS    Lt Shoulder Abduction PROM  90  -SS    Lt Shoulder Extension AROM  20  -SS    Lt Shoulder Flexion AROM  70  -SS    Lt Shoulder Flexion PROM  109 deg  -SS    Lt Shoulder External Rotation PROM  20 deg; measured in supine with shoulder abducted 45 deg  -SS    Lt Shoulder Internal Rotation PROM  60 deg; measured in supine with shoulder abducted 45 deg  -SS    Lt Elbow Extension/Flexion AROM  0-  -SS    Lt Elbow Extension/Flexion PROM  0 deg extension  -      User Key  (r) = Recorded By, (t) = Taken By, (c) = Cosigned By    Initials Name Provider Type     Eric Hein, PT DPT Physical Therapist            PT Assessment/Plan     Row Name 09/13/19 1400          PT Assessment    Functional Limitations  Performance in self-care ADL;Decreased safety during functional activities  -     Impairments  Joint mobility;Muscle strength;Pain  -     Assessment Comments  Improved PROM into flexion, abduction, IR, and ER this date. Active flexion near pre-injury movement.,  -SS     Rehab Potential  Good  -     Patient/caregiver participated in establishment of treatment plan and goals  Yes  -     Patient would benefit from skilled therapy intervention  Yes  -SS        PT Plan    PT Frequency  2x/week  -     Predicted Duration of Therapy Intervention (Therapy Eval)  6-8 weeks  -     PT Plan Comments  Continue POC. Progress ROM as tolerated.  -       User Key  (r) = Recorded By, (t) = Taken By, (c) = Cosigned By    Initials Name Provider Type    EMMANUELLE Hein  Eric Ch, PT DPT Physical Therapist          Modalities     Row Name 09/13/19 1400             Moist Heat    MH Applied  Yes  -SS      Location  L shoulder  -SS      Rx Minutes  10 mins  -SS      MH Prior to Rx  Yes  -SS        User Key  (r) = Recorded By, (t) = Taken By, (c) = Cosigned By    Initials Name Provider Type     Eric Hein, PT DPT Physical Therapist        OP Exercises     Row Name 09/13/19 1400             Subjective Comments    Subjective Comments  Pain is better than last therapy sessions. Has taken it easier the past few days. Grinding and sharp pain in the shoulder. Constant burning in both scapulae, which is starting to ease up a bit. Only sleeping 4-6 hours.   -SS         Subjective Pain    Able to rate subjective pain?  yes  -SS      Pre-Treatment Pain Level  -- 3-4/10  -SS      Post-Treatment Pain Level  5  -SS         Exercise 1    Exercise Name 1  MHP - see Modalities  -SS         Exercise 2    Exercise Name 2  PROM shoulder flexion, abduction, ER, IR  -SS      Cueing 2  Verbal;Tactile  -SS      Time 2  12 mins  -SS         Exercise 3    Exercise Name 3  Pro2, Seat 10, U/LE, ROM  -SS      Cueing 3  Verbal  -SS      Time 3  6 mins  -SS      Additional Comments  Level 1  -SS         Exercise 4    Exercise Name 4  Pulley scaption  -SS      Cueing 4  Verbal  -SS      Time 4  3 mins  -SS        User Key  (r) = Recorded By, (t) = Taken By, (c) = Cosigned By    Initials Name Provider Type     Eric Hein, PT DPT Physical Therapist              PT OP Goals     Row Name 09/13/19 1400          PT Short Term Goals    STG Date to Achieve  09/19/19  -SS     STG 1  Pt indep with HEP   -SS     STG 1 Progress  Ongoing  -SS     STG 2  Improve L shoulder MMT to 3+/5  -SS     STG 2 Progress  Ongoing  -SS     STG 3  Improve L shoulder abduction AAROM to 90°  -SS     STG 3 Progress  Ongoing  -SS        Long Term Goals    LTG Date to Achieve  10/03/19  -SS     LTG 1  Improve L shoulder  abduction AROM to 120°  -     LTG 1 Progress  Ongoing  -SS     LTG 2  Improve L shoulder MMT to 4/5  -     LTG 2 Progress  Ongoing  -SS     LTG 3  Reduce L scapula/L shoulder pain by 50%  -     LTG 3 Progress  Ongoing  -SS     LTG 4  Improve L shoulder ER AROM to 40°  -     LTG 4 Progress  Ongoing  -SS        Time Calculation    PT Goal Re-Cert Due Date  09/26/19  -       User Key  (r) = Recorded By, (t) = Taken By, (c) = Cosigned By    Initials Name Provider Type    SS Eric Hein, PT DPT Physical Therapist          Therapy Education  Given: HEP  Program: Reinforced  How Provided: Verbal  Provided to: Patient  Level of Understanding: Verbalized              Time Calculation:   Start Time: 1437  Stop Time: 1520  Time Calculation (min): 43 min  Total Timed Code Minutes- PT: 33 minute(s)  Therapy Charges for Today     Code Description Service Date Service Provider Modifiers Qty    15158917299 HC PT THER PROC EA 15 MIN 9/13/2019 Eric Hein, PT DPT GP 2    68058395762 HC PT THER SUPP EA 15 MIN 9/13/2019 Eric Hein, PT DPT GP 1                    Eric Hein, PT, DPT, CHT  9/13/2019

## 2019-09-18 ENCOUNTER — HOSPITAL ENCOUNTER (OUTPATIENT)
Dept: PHYSICAL THERAPY | Facility: HOSPITAL | Age: 59
Setting detail: THERAPIES SERIES
Discharge: HOME | End: 2019-09-18

## 2019-09-18 DIAGNOSIS — M25.512 LEFT SHOULDER PAIN, UNSPECIFIED CHRONICITY: Primary | ICD-10-CM

## 2019-09-18 PROCEDURE — 97110 THERAPEUTIC EXERCISES: CPT

## 2019-09-18 NOTE — THERAPY TREATMENT NOTE
Outpatient Physical Therapy Ortho Treatment Note  [facility]     Patient Name: Crow Moffett  : 1960  MRN: [id number]  Today's Date: 2019      Visit Date: 2019  Pt has attended 4/4 visits  MD 19  Amita 19  Visit Dx:    ICD-10-CM ICD-9-CM   1. Left shoulder pain, unspecified chronicity M25.512 719.41       Patient Active Problem List   Diagnosis   • Left shoulder pain   • Closed displaced fracture of body of left scapula        Past Medical History:   Diagnosis Date   • Acute maxillary sinusitis    • Closed fracture of distal end of ulna    • Closed fracture of humerus    • Edema of upper extremity     left upper arm with redness    • Hoarse    • Polyuria    • Shoulder pain    • Slow urinary stream         Past Surgical History:   Procedure Laterality Date   • HUMERUS SURGERY  2013    Anesth, humerus surgery (OPen reduction and internal fixation with ulnar nerve transposition with triceps reflection. Procedure done under fluoroscopic control; final x-rays AP and oblique of the elbow maintained in medical record.)   • HUMERUS SURGERY  2013    Anesth, humerus surgery (Left humerus open reduction and internal fixation. Fractured right ulna distal shaft. fractured left humeral shaft.Procedure done under fluoroscopic control with multiple x-rays, fluoroscopy throughout the procedure.)   • INJECTION OF MEDICATION  2010    Kenalog (1)      • OTHER SURGICAL HISTORY  2014    Anesth, elbow area surgery (Removal of wire, screw and washer of the elbow. ECRB pronator teres transfer. Long & ring sublimis transfers to extensor tendons of thumb, index, long, ring & small-ring sublimis to long, ring & small fingers & long sublimis to thumb & index.)                       PT Assessment/Plan     Row Name 19 1345          PT Assessment    Assessment Comments  Pt given pulley system for home use to assist with ROM.  Reviewed some exercises and light resistive  work encouraged; however encouraged to proceed within pain free rnage  -SP        PT Plan    PT Frequency  2x/week  -SP     Predicted Duration of Therapy Intervention (Therapy Eval)  6-8 weeks  -SP     PT Plan Comments  Continue with ROM and light strength work as tolerated  -SP       User Key  (r) = Recorded By, (t) = Taken By, (c) = Cosigned By    Initials Name Provider Type    Shoshana Le PTA Physical Therapy Assistant            OP Exercises     Row Name 09/18/19 1300             Subjective Comments    Subjective Comments  Arrives late for appt.  wears wrist splint on L wrist.  Notes shle not so bad arm hurts  -SP         Subjective Pain    Able to rate subjective pain?  yes  -SP      Pre-Treatment Pain Level  4  -SP      Post-Treatment Pain Level  3  -SP      Subjective Pain Comment  `  -SP         Exercise 1    Exercise Name 1  Pro 2   -SP      Time 1  10 min  -SP      Additional Comments  Level 2  -SP         Exercise 2    Exercise Name 2  pulleys  -SP      Reps 2  20  -SP         Exercise 3    Exercise Name 3  scap squeezes with tband  -SP      Reps 3  20  -SP         Exercise 4    Exercise Name 4  No moneys  -SP      Reps 4  20  -SP        User Key  (r) = Recorded By, (t) = Taken By, (c) = Cosigned By    Initials Name Provider Type    Shoshana Le PTA Physical Therapy Assistant                       PT OP Goals     Row Name 09/18/19 1353 09/18/19 1300       PT Short Term Goals    STG Date to Achieve  --  09/19/19  -SP    STG 1  --  Pt indep with HEP   -SP    STG 1 Progress  --  Ongoing  -SP    STG 2  --  Improve L shoulder MMT to 3+/5  -SP    STG 2 Progress  --  Ongoing  -SP    STG 3  --  Improve L shoulder abduction AAROM to 90°  -SP    STG 3 Progress  --  Ongoing  -SP       Long Term Goals    LTG Date to Achieve  --  10/03/19  -SP    LTG 1  --  Improve L shoulder abduction AROM to 120°  -SP    LTG 1 Progress  --  Ongoing  -SP    LTG 2  --  Improve L shoulder MMT to 4/5  -SP    LTG 2  Progress  --  Ongoing  -SP    LTG 3  --  Reduce L scapula/L shoulder pain by 50%  -SP    LTG 3 Progress  --  Ongoing  -SP    LTG 4  --  Improve L shoulder ER AROM to 40°  -SP    LTG 4 Progress  --  Ongoing  -SP       Time Calculation    PT Goal Re-Cert Due Date  09/26/19  -SP  09/26/19  -SP      User Key  (r) = Recorded By, (t) = Taken By, (c) = Cosigned By    Initials Name Provider Type    SP Shoshana Sharma PTA Physical Therapy Assistant                         Time Calculation:   Start Time: 1315  Stop Time: 1346  Time Calculation (min): 31 min  Total Timed Code Minutes- PT: 31 minute(s)  Therapy Charges for Today     Code Description Service Date Service Provider Modifiers Qty    89979457638  PT THER PROC EA 15 MIN 9/18/2019 Shoshana Sharma PTA GP 2                    Shoshana Sharma PTA  9/18/2019

## 2019-09-20 ENCOUNTER — HOSPITAL ENCOUNTER (OUTPATIENT)
Dept: PHYSICAL THERAPY | Facility: HOSPITAL | Age: 59
Setting detail: THERAPIES SERIES
Discharge: HOME | End: 2019-09-20

## 2019-09-20 DIAGNOSIS — M25.512 LEFT SHOULDER PAIN, UNSPECIFIED CHRONICITY: Primary | ICD-10-CM

## 2019-09-20 DIAGNOSIS — S42.112A CLOSED DISPLACED FRACTURE OF BODY OF LEFT SCAPULA, INITIAL ENCOUNTER: ICD-10-CM

## 2019-09-20 PROCEDURE — 97110 THERAPEUTIC EXERCISES: CPT | Performed by: PHYSICAL THERAPIST

## 2019-09-20 NOTE — THERAPY TREATMENT NOTE
Outpatient Physical Therapy Ortho Treatment Note  [facility]     Patient Name: Crow Moffett  : 1960  MRN: [id number]  Today's Date: 2019      Visit Date: 2019  Pt has attended 5/5 visits  MD 19  Amita 19     Visit Dx:    ICD-10-CM ICD-9-CM   1. Left shoulder pain, unspecified chronicity M25.512 719.41   2. Closed displaced fracture of body of left scapula, initial encounter S42.112A 811.09       Patient Active Problem List   Diagnosis   • Left shoulder pain   • Closed displaced fracture of body of left scapula        Past Medical History:   Diagnosis Date   • Acute maxillary sinusitis    • Closed fracture of distal end of ulna    • Closed fracture of humerus    • Edema of upper extremity     left upper arm with redness    • Hoarse    • Polyuria    • Shoulder pain    • Slow urinary stream         Past Surgical History:   Procedure Laterality Date   • HUMERUS SURGERY  2013    Anesth, humerus surgery (OPen reduction and internal fixation with ulnar nerve transposition with triceps reflection. Procedure done under fluoroscopic control; final x-rays AP and oblique of the elbow maintained in medical record.)   • HUMERUS SURGERY  2013    Anesth, humerus surgery (Left humerus open reduction and internal fixation. Fractured right ulna distal shaft. fractured left humeral shaft.Procedure done under fluoroscopic control with multiple x-rays, fluoroscopy throughout the procedure.)   • INJECTION OF MEDICATION  2010    Kenalog (1)      • OTHER SURGICAL HISTORY  2014    Anesth, elbow area surgery (Removal of wire, screw and washer of the elbow. ECRB pronator teres transfer. Long & ring sublimis transfers to extensor tendons of thumb, index, long, ring & small-ring sublimis to long, ring & small fingers & long sublimis to thumb & index.)       PT Ortho     Row Name 19 1100       Subjective Comments    Subjective Comments  pt arrived 21 min late for PT  session,   -BS       Subjective Pain    Able to rate subjective pain?  yes  -BS    Pre-Treatment Pain Level  3  -BS    Post-Treatment Pain Level  4  -BS    Subjective Pain Comment  L scapula  -BS      User Key  (r) = Recorded By, (t) = Taken By, (c) = Cosigned By    Initials Name Provider Type    Masood Allen, PT Physical Therapist                      PT Assessment/Plan     Row Name 09/20/19 1100          PT Assessment    Assessment Comments  slight increase in L shoulder/scapula pain post tx with light resistance ex to L shoulder/scapula region.  -BS        PT Plan    PT Frequency  2x/week  -BS     Predicted Duration of Therapy Intervention (Therapy Eval)  6-8 weeks  -BS     PT Plan Comments  continue gentle PRE  -BS       User Key  (r) = Recorded By, (t) = Taken By, (c) = Cosigned By    Initials Name Provider Type    Masood Allen, PT Physical Therapist            OP Exercises     Row Name 09/20/19 1100             Subjective Comments    Subjective Comments  pt arrived 21 min late for PT session,   -BS         Subjective Pain    Able to rate subjective pain?  yes  -BS      Pre-Treatment Pain Level  3  -BS      Post-Treatment Pain Level  4  -BS      Subjective Pain Comment  L scapula  -BS         Exercise 1    Exercise Name 1  Pro 2   -BS      Time 1  5'  -BS      Additional Comments  L2  -BS         Exercise 2    Exercise Name 2  pulleys  -BS      Reps 2  20 ea  -BS      Additional Comments  flex/scap  -BS         Exercise 3    Exercise Name 3  resisted mid rows w/ yellow TB  -BS      Reps 3  20  -BS         Exercise 4    Exercise Name 4  resisted B shoulder ext w/ yellow TB  -BS      Reps 4  20  -BS         Exercise 5    Exercise Name 5  supine serratus punches  -BS      Sets 5  2  -BS      Reps 5  10  -BS      Additional Comments  2nd set w/ 2# wt  -BS         Exercise 6    Exercise Name 6  supine resisted triceps w/ 1 lb wt  -BS      Sets 6  1  -BS      Reps 6  20  -BS         Exercise 7    Exercise  Name 7  S/L L shoulder ER AROM  -BS      Sets 7  1  -BS      Reps 7  20  -BS         Exercise 8    Exercise Name 8  PROM with clinician-L shoulder   -BS      Time 8  4'  -BS      Additional Comments  flex, abd  -BS        User Key  (r) = Recorded By, (t) = Taken By, (c) = Cosigned By    Initials Name Provider Type    BS Masood Vargas, PT Physical Therapist                       PT OP Goals     Row Name 09/20/19 1100          PT Short Term Goals    STG Date to Achieve  09/19/19  -BS     STG 1  Pt indep with HEP   -BS     STG 1 Progress  Ongoing  -BS     STG 2  Improve L shoulder MMT to 3+/5  -BS     STG 2 Progress  Ongoing  -BS     STG 3  Improve L shoulder abduction AAROM to 90°  -BS     STG 3 Progress  Ongoing  -BS        Long Term Goals    LTG Date to Achieve  10/03/19  -BS     LTG 1  Improve L shoulder abduction AROM to 120°  -BS     LTG 1 Progress  Ongoing  -BS     LTG 2  Improve L shoulder MMT to 4/5  -BS     LTG 2 Progress  Ongoing  -BS     LTG 3  Reduce L scapula/L shoulder pain by 50%  -BS     LTG 3 Progress  Ongoing  -BS     LTG 4  Improve L shoulder ER AROM to 40°  -BS     LTG 4 Progress  Ongoing  -BS        Time Calculation    PT Goal Re-Cert Due Date  09/26/19  -BS       User Key  (r) = Recorded By, (t) = Taken By, (c) = Cosigned By    Initials Name Provider Type    Masood Allen, PT Physical Therapist          Therapy Education  Education Details: HEP: see flow sheet  Given: HEP  Program: New, Reinforced  How Provided: Verbal, Demonstration  Provided to: Patient  Level of Understanding: Verbalized              Time Calculation:   Start Time: 1121  Stop Time: 1150  Time Calculation (min): 29 min  Total Timed Code Minutes- PT: 29 minute(s)  Therapy Charges for Today     Code Description Service Date Service Provider Modifiers Qty    73240580079 HC PT THER PROC EA 15 MIN 9/20/2019 Masood Vargas, PT GP 2                    Masood Vargas, PT  9/20/2019

## 2019-09-23 ENCOUNTER — CLINICAL SUPPORT (OUTPATIENT)
Dept: AUDIOLOGY | Facility: CLINIC | Age: 59
End: 2019-09-23

## 2019-09-23 ENCOUNTER — HOSPITAL ENCOUNTER (OUTPATIENT)
Dept: PHYSICAL THERAPY | Facility: HOSPITAL | Age: 59
Setting detail: THERAPIES SERIES
Discharge: HOME | End: 2019-09-23

## 2019-09-23 DIAGNOSIS — S42.112A CLOSED DISPLACED FRACTURE OF BODY OF LEFT SCAPULA, INITIAL ENCOUNTER: ICD-10-CM

## 2019-09-23 DIAGNOSIS — Z71.89 ENCOUNTER FOR HEARING AID CONSULTATION: Primary | ICD-10-CM

## 2019-09-23 DIAGNOSIS — M25.512 LEFT SHOULDER PAIN, UNSPECIFIED CHRONICITY: Primary | ICD-10-CM

## 2019-09-23 PROCEDURE — 97110 THERAPEUTIC EXERCISES: CPT

## 2019-09-23 PROCEDURE — G0283 ELEC STIM OTHER THAN WOUND: HCPCS

## 2019-09-23 PROCEDURE — HEARINGNOCHG: Performed by: AUDIOLOGIST

## 2019-09-23 NOTE — THERAPY TREATMENT NOTE
Outpatient Physical Therapy Ortho Treatment Note  [facility]     Patient Name: Crow Moffett  : 1960  MRN: [id number]  Today's Date: 2019      Visit Date: 2019     Subjective Improvement 0  Visits 6/6  Visits approved medicare   RTMD 2019  Recert Date 2019    Left Scapular fx on 2019    Visit Dx:    ICD-10-CM ICD-9-CM   1. Left shoulder pain, unspecified chronicity M25.512 719.41   2. Closed displaced fracture of body of left scapula, initial encounter S42.112A 811.09       Patient Active Problem List   Diagnosis   • Left shoulder pain   • Closed displaced fracture of body of left scapula        Past Medical History:   Diagnosis Date   • Acute maxillary sinusitis    • Closed fracture of distal end of ulna    • Closed fracture of humerus    • Edema of upper extremity     left upper arm with redness    • Hoarse    • Polyuria    • Shoulder pain    • Slow urinary stream         Past Surgical History:   Procedure Laterality Date   • HUMERUS SURGERY  2013    Anesth, humerus surgery (OPen reduction and internal fixation with ulnar nerve transposition with triceps reflection. Procedure done under fluoroscopic control; final x-rays AP and oblique of the elbow maintained in medical record.)   • HUMERUS SURGERY  2013    Anesth, humerus surgery (Left humerus open reduction and internal fixation. Fractured right ulna distal shaft. fractured left humeral shaft.Procedure done under fluoroscopic control with multiple x-rays, fluoroscopy throughout the procedure.)   • INJECTION OF MEDICATION  2010    Kenalog (1)      • OTHER SURGICAL HISTORY  2014    Anesth, elbow area surgery (Removal of wire, screw and washer of the elbow. ECRB pronator teres transfer. Long & ring sublimis transfers to extensor tendons of thumb, index, long, ring & small-ring sublimis to long, ring & small fingers & long sublimis to thumb & index.)                       PT Assessment/Plan   "   Row Name 09/23/19 1612          PT Assessment    Assessment Comments  Patient appears to be slow to improvement.  He is willing to attempt any new ther ex.  He did tolerated PROM well.  Limited PROM in ER and IR.  -CP        PT Plan    PT Frequency  1x/week  -CP     Predicted Duration of Therapy Intervention (Therapy Eval)  4 weeks  -CP     PT Plan Comments  Cont with POC.  Recheck scheduled for next week.  -CP       User Key  (r) = Recorded By, (t) = Taken By, (c) = Cosigned By    Initials Name Provider Type    CP Gloria Campbell PTA Physical Therapy Assistant          Modalities     Row Name 09/23/19 1500             Moist Heat    MH Applied  Yes  -CP      Location  left shoulder  -CP      Rx Minutes  15 mins  -CP      MH S/P Rx  Yes  -CP         ELECTRICAL STIMULATION    Attended/Unattended  Unattended  -CP      Stimulation Type  IFC  -CP      Location/Electrode Placement/Other  left shoulder  -CP       PT E-Stim Unattended (Manual) Minutes  15  -CP        User Key  (r) = Recorded By, (t) = Taken By, (c) = Cosigned By    Initials Name Provider Type    Gloria Adams, LINDA Physical Therapy Assistant        OP Exercises     Row Name 09/23/19 1500             Subjective Comments    Subjective Comments  Patient cont to report pain in left shoulder area.  He does have other problems so he thinks he will have pain all the time.  -CP         Subjective Pain    Able to rate subjective pain?  yes  -CP      Pre-Treatment Pain Level  3  -CP      Post-Treatment Pain Level  3  -CP         Exercise 1    Exercise Name 1  Pro II level 3  -CP      Time 1  10  -CP      Additional Comments  UE/LE FW/BW  -CP         Exercise 2    Exercise Name 2  pulleys  -CP      Reps 2  30 each fl and scap  -CP         Exercise 3    Exercise Name 3  isometrics 4 way  -CP      Cueing 3  Verbal;Demo  -CP      Reps 3  10  -CP      Time 3  5\" hold  -CP         Exercise 4    Exercise Name 4  PROM  -CP      Time 4  13  -CP        User " Key  (r) = Recorded By, (t) = Taken By, (c) = Cosigned By    Initials Name Provider Type    CP Gloria Campbell, LINDA Physical Therapy Assistant                       PT OP Goals     Row Name 09/23/19 1500          PT Short Term Goals    STG Date to Achieve  09/19/19  -CP     STG 1  Pt indep with HEP   -CP     STG 1 Progress  Ongoing  -CP     STG 2  Improve L shoulder MMT to 3+/5  -CP     STG 2 Progress  Ongoing  -CP     STG 3  Improve L shoulder abduction AAROM to 90°  -CP     STG 3 Progress  Ongoing  -CP        Long Term Goals    LTG Date to Achieve  10/03/19  -CP     LTG 1  Improve L shoulder abduction AROM to 120°  -CP     LTG 1 Progress  Ongoing  -CP     LTG 2  Improve L shoulder MMT to 4/5  -CP     LTG 2 Progress  Ongoing  -CP     LTG 3  Reduce L scapula/L shoulder pain by 50%  -CP     LTG 3 Progress  Ongoing  -CP     LTG 4  Improve L shoulder ER AROM to 40°  -CP     LTG 4 Progress  Ongoing  -CP        Time Calculation    PT Goal Re-Cert Due Date  09/26/19  -CP       User Key  (r) = Recorded By, (t) = Taken By, (c) = Cosigned By    Initials Name Provider Type    CP Gloria Campbell, LINDA Physical Therapy Assistant          Therapy Education  Education Details: isometric fl, ab, ext, ir and er  Given: HEP  Program: New  How Provided: Verbal, Demonstration, Written  Provided to: Patient  Level of Understanding: Teach back education performed, Verbalized, Demonstrated              Time Calculation:   Start Time: 1520  Stop Time: 1620  Time Calculation (min): 60 min  Total Timed Code Minutes- PT: 39 minute(s)  Therapy Charges for Today     Code Description Service Date Service Provider Modifiers Qty    22480053671 HC PT ELECTRICAL STIM UNATTENDED 9/23/2019 Gloria Campbell, PTA  1    54217528626 HC PT THER PROC EA 15 MIN 9/23/2019 Gloria Campbell, LINDA GP 3                    Gloria Campbell PTA  9/23/2019

## 2019-09-23 NOTE — PROGRESS NOTES
HEARING AID CONSULT    Name:  Crow Moffett  :  1960  Age:  59 y.o.  Date of Evaluation:  2019      HISTORY    Reason for visit:  Crow Moffett is seen today for a hearing aid consultation at the request of Dr. Travis Braun.  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  Patient reports he is interested in trying a hearing aid in his right ear.  He states his on a limited income, and he may need financial assistance.    Hearing aid history:  Patient does not have hearing aids at this time.      RECOMMENDATIONS:  During the Hearing Aid discussion, Mr. Moffett has chosen 1  in the [place] (SAQIB) hearing aid(s) for right ear.  The hearing aid recommended is from [facility] through the [employer] program.  He was given a [employer] application to apply for this hearing aid.  Once he receives his approval letter, he was instructed to return to our department for his fitting.          It was a pleasure seeing Crow Moffett in Audiology today.  I look forward to helping Mr. Moffett with his amplification needs.            This document has been electronically signed by Graciela Hein MS CCC-A on 2019 3:05 PM       Graciela Hein MS CCC-A  Licensed Audiologist    For Billing and Coding:  Z71.89  Encounter for Hearing Aid Consultation - no charge

## 2019-09-26 ENCOUNTER — HOSPITAL ENCOUNTER (OUTPATIENT)
Dept: PHYSICAL THERAPY | Facility: HOSPITAL | Age: 59
Setting detail: THERAPIES SERIES
Discharge: HOME | End: 2019-09-26

## 2019-09-26 DIAGNOSIS — M25.512 LEFT SHOULDER PAIN, UNSPECIFIED CHRONICITY: Primary | ICD-10-CM

## 2019-09-26 DIAGNOSIS — S42.112A CLOSED DISPLACED FRACTURE OF BODY OF LEFT SCAPULA, INITIAL ENCOUNTER: ICD-10-CM

## 2019-09-26 PROCEDURE — G0283 ELEC STIM OTHER THAN WOUND: HCPCS

## 2019-09-26 PROCEDURE — 97110 THERAPEUTIC EXERCISES: CPT

## 2019-09-26 NOTE — THERAPY TREATMENT NOTE
Outpatient Physical Therapy Ortho Treatment Note  [facility]     Patient Name: Crow Moffett  : 1960  MRN: [id number]  Today's Date: 2019      Visit Date: 2019     Subjective Improvement 0  Visits 7/7  Visits approved medicare cap  RTMD 2019  Recert Date 2019    Left Scapular fx on 2019    Visit Dx:    ICD-10-CM ICD-9-CM   1. Left shoulder pain, unspecified chronicity M25.512 719.41   2. Closed displaced fracture of body of left scapula, initial encounter S42.112A 811.09       Patient Active Problem List   Diagnosis   • Left shoulder pain   • Closed displaced fracture of body of left scapula        Past Medical History:   Diagnosis Date   • Acute maxillary sinusitis    • Closed fracture of distal end of ulna    • Closed fracture of humerus    • Edema of upper extremity     left upper arm with redness    • Hoarse    • Polyuria    • Shoulder pain    • Slow urinary stream         Past Surgical History:   Procedure Laterality Date   • HUMERUS SURGERY  2013    Anesth, humerus surgery (OPen reduction and internal fixation with ulnar nerve transposition with triceps reflection. Procedure done under fluoroscopic control; final x-rays AP and oblique of the elbow maintained in medical record.)   • HUMERUS SURGERY  2013    Anesth, humerus surgery (Left humerus open reduction and internal fixation. Fractured right ulna distal shaft. fractured left humeral shaft.Procedure done under fluoroscopic control with multiple x-rays, fluoroscopy throughout the procedure.)   • INJECTION OF MEDICATION  2010    Kenalog (1)      • OTHER SURGICAL HISTORY  2014    Anesth, elbow area surgery (Removal of wire, screw and washer of the elbow. ECRB pronator teres transfer. Long & ring sublimis transfers to extensor tendons of thumb, index, long, ring & small-ring sublimis to long, ring & small fingers & long sublimis to thumb & index.)                       PT  "Assessment/Plan     Row Name 09/26/19 1609          PT Assessment    Assessment Comments  Patient was 10 minutes late for appointment.  Patient does have limited use of left UE secondary to a previous injury  -CP        PT Plan    PT Frequency  1x/week  -CP     Predicted Duration of Therapy Intervention (Therapy Eval)  4 weeks  -CP     PT Plan Comments  Cont with POC.  standing/supine AROM fl to 90  -CP       User Key  (r) = Recorded By, (t) = Taken By, (c) = Cosigned By    Initials Name Provider Type    CP Gloria Campbell PTA Physical Therapy Assistant          Modalities     Row Name 09/26/19 1600             Moist Heat    MH Applied  Yes  -CP      Location  left shoulder with IFC  -CP      Rx Minutes  15 mins  -CP      MH S/P Rx  Yes  -CP         ELECTRICAL STIMULATION    Attended/Unattended  Unattended  -CP      Stimulation Type  IFC  -CP      Location/Electrode Placement/Other  left shoulder with MHP  -CP       PT E-Stim Unattended (Manual) Minutes  15  -CP        User Key  (r) = Recorded By, (t) = Taken By, (c) = Cosigned By    Initials Name Provider Type    Gloria Adams PTA Physical Therapy Assistant        OP Exercises     Row Name 09/26/19 1600             Subjective Comments    Subjective Comments  Patient reports no major change to left shoulder.  -CP         Subjective Pain    Able to rate subjective pain?  yes  -CP      Pre-Treatment Pain Level  2  -CP      Post-Treatment Pain Level  2  -CP         Exercise 1    Exercise Name 1  Pro II level 3  -CP      Time 1  10  -CP      Additional Comments  FW/BW  -CP         Exercise 2    Exercise Name 2  pulleys fl and ab  -CP      Reps 2  30 each  -CP         Exercise 3    Exercise Name 3  isometrics pull down onto tball  -CP      Cueing 3  Verbal;Demo  -CP      Sets 3  2  -CP      Reps 3  10  -CP      Time 3  3\" hold  -CP         Exercise 4    Exercise Name 4  standing shoulder AAROM fl and AB on tball  -CP      Cueing 4  Verbal;Demo  -CP      " Reps 4  20 each   -CP         Exercise 5    Exercise Name 5  AAROM fl on split table seated  -CP      Cueing 5  Verbal;Demo  -CP      Sets 5  2  -CP      Reps 5  10  -CP        User Key  (r) = Recorded By, (t) = Taken By, (c) = Cosigned By    Initials Name Provider Type    CP Gloria Campbell, LINDA Physical Therapy Assistant                       PT OP Goals     Row Name 09/26/19 1600          PT Short Term Goals    STG Date to Achieve  09/19/19  -CP     STG 1  Pt indep with HEP   -CP     STG 1 Progress  Ongoing  -CP     STG 2  Improve L shoulder MMT to 3+/5  -CP     STG 2 Progress  Ongoing  -CP     STG 3  Improve L shoulder abduction AAROM to 90°  -CP     STG 3 Progress  Ongoing  -CP        Long Term Goals    LTG Date to Achieve  10/03/19  -CP     LTG 1  Improve L shoulder abduction AROM to 120°  -CP     LTG 1 Progress  Ongoing  -CP     LTG 2  Improve L shoulder MMT to 4/5  -CP     LTG 2 Progress  Ongoing  -CP     LTG 3  Reduce L scapula/L shoulder pain by 50%  -CP     LTG 3 Progress  Ongoing  -CP     LTG 4  Improve L shoulder ER AROM to 40°  -CP     LTG 4 Progress  Ongoing  -CP        Time Calculation    PT Goal Re-Cert Due Date  09/26/19  -CP       User Key  (r) = Recorded By, (t) = Taken By, (c) = Cosigned By    Initials Name Provider Type    CP Gloria Campbell PTA Physical Therapy Assistant                         Time Calculation:   Start Time: 1530  Stop Time: 1625  Time Calculation (min): 55 min  Total Timed Code Minutes- PT: 29 minute(s)  Therapy Charges for Today     Code Description Service Date Service Provider Modifiers Qty    25181626914 HC PT ELECTRICAL STIM UNATTENDED 9/26/2019 Gloria Campbell, PTA  1    70209163121 HC PT THER PROC EA 15 MIN 9/26/2019 Gloria Campbell PTA GP 2                    Gloria Campbell PTA  9/26/2019

## 2019-09-27 DIAGNOSIS — S42.112A CLOSED DISPLACED FRACTURE OF BODY OF LEFT SCAPULA, INITIAL ENCOUNTER: Primary | ICD-10-CM

## 2019-09-30 ENCOUNTER — OFFICE VISIT (OUTPATIENT)
Dept: ORTHOPEDIC SURGERY | Facility: CLINIC | Age: 59
End: 2019-09-30

## 2019-09-30 VITALS — BODY MASS INDEX: 31.98 KG/M2 | WEIGHT: 211 LBS | HEIGHT: 68 IN

## 2019-09-30 DIAGNOSIS — S42.112A CLOSED DISPLACED FRACTURE OF BODY OF LEFT SCAPULA, INITIAL ENCOUNTER: Primary | ICD-10-CM

## 2019-09-30 PROCEDURE — 99024 POSTOP FOLLOW-UP VISIT: CPT | Performed by: ORTHOPAEDIC SURGERY

## 2019-09-30 NOTE — PROGRESS NOTES
"Crow Moffett is a 59 y.o. male returns for     Chief Complaint   Patient presents with   • Left Shoulder - Post-op       HISTORY OF PRESENT ILLNESS: Patient presents today for a post op on his left shoulder. Xrays done on 09/30/19.  Doing much better less pain discomfort a few weeks ago had a pop in his shoulder is been doing much better since then doing physical therapy.       CONCURRENT MEDICAL HISTORY:    The following portions of the patient's history were reviewed and updated as appropriate: allergies, current medications, past family history, past medical history, past social history, past surgical history and problem list.     ROS  No fevers or chills.  No chest pain or shortness of air.  No GI or  disturbances.    PHYSICAL EXAMINATION:       Ht 172.7 cm (68\")   Wt 95.7 kg (211 lb)   BMI 32.08 kg/m²     Physical Exam    GAIT:     []  Normal  []  Antalgic    Assistive device: []  None  []  Walker     []  Crutches  []  Cane     []  Wheelchair  []  Stretcher    Ortho Exam  Motion is better still decreased neurologically intact not as tender but without tenderness posteriorly.    No results found.    X-rays are reviewed.  No significant change marked osteoarthritic change deformity of the proximal humerus step-off is noted of the scapula      ASSESSMENT:    Diagnoses and all orders for this visit:    Closed displaced fracture of body of left scapula, initial encounter          PLAN continue therapy range of motion we will check a back one more time in a month overall he is doing better at this point.    No Follow-up on file.    Pete Muñoz MD    "

## 2019-09-30 NOTE — PROGRESS NOTES
"Crow Moffett is a 59 y.o. male returns for     Chief Complaint   Patient presents with   • Left Shoulder - Post-op       HISTORY OF PRESENT ILLNESS: Patient presents today for a post op on his left shoulder.        CONCURRENT MEDICAL HISTORY:    The following portions of the patient's history were reviewed and updated as appropriate: allergies, current medications, past family history, past medical history, past social history, past surgical history and problem list.     ROS  No fevers or chills.  No chest pain or shortness of air.  No GI or  disturbances.    PHYSICAL EXAMINATION:       Ht 172.7 cm (68\")   Wt 87.2 kg (192 lb 3.2 oz)   BMI 29.22 kg/m²     Physical Exam    GAIT:     []  Normal  []  Antalgic    Assistive device: []  None  []  Walker     []  Crutches  []  Cane     []  Wheelchair  []  Stretcher    Ortho Exam      No results found.          ASSESSMENT:    There are no diagnoses linked to this encounter.      PLAN    No Follow-up on file.    Olamide Morales MA    "

## 2019-10-02 ENCOUNTER — HOSPITAL ENCOUNTER (OUTPATIENT)
Dept: PHYSICAL THERAPY | Facility: HOSPITAL | Age: 59
Setting detail: THERAPIES SERIES
Discharge: HOME | End: 2019-10-02

## 2019-10-02 DIAGNOSIS — S42.112A CLOSED DISPLACED FRACTURE OF BODY OF LEFT SCAPULA, INITIAL ENCOUNTER: ICD-10-CM

## 2019-10-02 DIAGNOSIS — M25.512 LEFT SHOULDER PAIN, UNSPECIFIED CHRONICITY: Primary | ICD-10-CM

## 2019-10-02 PROCEDURE — G0283 ELEC STIM OTHER THAN WOUND: HCPCS

## 2019-10-02 PROCEDURE — 97110 THERAPEUTIC EXERCISES: CPT

## 2019-10-02 PROCEDURE — 97110 THERAPEUTIC EXERCISES: CPT | Performed by: PHYSICAL THERAPIST

## 2019-10-02 NOTE — THERAPY PROGRESS REPORT/RE-CERT
"    Outpatient Physical Therapy Ortho Progress Note  [facility]     Patient Name: Crow Moffett  : 1960  MRN: [id number]  Today's Date: 10/2/2019      Visit Date: 10/02/2019  Subjective Improvement \"some\"  Visits   Visits approved medicare cap  RTMD 2019  Recert Date 10-     Left Scapular fx on 2019     Patient Active Problem List   Diagnosis   • Left shoulder pain   • Closed displaced fracture of body of left scapula        Past Medical History:   Diagnosis Date   • Acute maxillary sinusitis    • Closed fracture of distal end of ulna    • Closed fracture of humerus    • Edema of upper extremity     left upper arm with redness    • Hoarse    • Polyuria    • Shoulder pain    • Slow urinary stream         Past Surgical History:   Procedure Laterality Date   • HUMERUS SURGERY  2013    Anesth, humerus surgery (OPen reduction and internal fixation with ulnar nerve transposition with triceps reflection. Procedure done under fluoroscopic control; final x-rays AP and oblique of the elbow maintained in medical record.)   • HUMERUS SURGERY  2013    Anesth, humerus surgery (Left humerus open reduction and internal fixation. Fractured right ulna distal shaft. fractured left humeral shaft.Procedure done under fluoroscopic control with multiple x-rays, fluoroscopy throughout the procedure.)   • INJECTION OF MEDICATION  2010    Kenalog (1)      • OTHER SURGICAL HISTORY  2014    Anesth, elbow area surgery (Removal of wire, screw and washer of the elbow. ECRB pronator teres transfer. Long & ring sublimis transfers to extensor tendons of thumb, index, long, ring & small-ring sublimis to long, ring & small fingers & long sublimis to thumb & index.)       Visit Dx:     ICD-10-CM ICD-9-CM   1. Left shoulder pain, unspecified chronicity M25.512 719.41   2. Closed displaced fracture of body of left scapula, initial encounter S42.112A 811.09             PT Ortho     Row Name " 10/02/19 1500       General ROM    GENERAL ROM COMMENTS  AROM: L shoulder-abd 81 deg ER 30 deg  -BS       MMT (Manual Muscle Testing)    General MMT Comments  MMT: L shoulder flex 4- abd 3+ ER 3+ IR 4+  -BS      User Key  (r) = Recorded By, (t) = Taken By, (c) = Cosigned By    Initials Name Provider Type    Masood Allen, PT Physical Therapist                            PT OP Goals     Row Name 10/02/19 8570          PT Short Term Goals    STG Date to Achieve  09/19/19  -BS     STG 1  Pt indep with HEP   -BS     STG 1 Progress  Ongoing  -BS     STG 2  Improve L shoulder MMT to 3+/5  -BS     STG 2 Progress  Met  -BS     STG 3  Improve L shoulder abduction AAROM to 90°  -BS     STG 3 Progress  Ongoing  -BS        Long Term Goals    LTG Date to Achieve  10/03/19  -BS     LTG 1  Improve L shoulder abduction AROM to 120°  -BS     LTG 1 Progress  Ongoing  -BS     LTG 2  Improve L shoulder MMT to 4/5  -BS     LTG 2 Progress  Ongoing  -BS     LTG 3  Reduce L scapula/L shoulder pain by 50%  -BS     LTG 3 Progress  Met  -BS     LTG 4  Improve L shoulder ER AROM to 40°  -BS     LTG 4 Progress  Ongoing  -BS       User Key  (r) = Recorded By, (t) = Taken By, (c) = Cosigned By    Initials Name Provider Type    Masood Allen, PT Physical Therapist          PT Assessment/Plan     Row Name 10/02/19 1044          PT Assessment    Functional Limitations  Performance in self-care ADL;Decreased safety during functional activities  -BS     Impairments  Joint mobility;Muscle strength;Pain  -BS     Assessment Comments  Slowly progressing toward ROM/MMT goals. Limited by pain and profound weakness, especially with abd>flexion.   -BS     Please refer to paper survey for additional self-reported information  Yes  -BS     Rehab Potential  Good  -BS     Patient/caregiver participated in establishment of treatment plan and goals  Yes  -BS     Patient would benefit from skilled therapy intervention  Yes  -BS        PT Plan    PT Frequency   1x/week  -BS     Predicted Duration of Therapy Intervention (Therapy Eval)  4 weeks  -BS     Planned CPT's?  PT RE-EVAL: 81061;PT THER PROC EA 15 MIN: 48919;PT THER ACT EA 15 MIN: 34634;PT MANUAL THERAPY EA 15 MIN: 31429;PT ELECTRICAL STIM UNATTEND: ;PT ELECTRICAL STIM ATTD EA 15 MIN: 82492;PT HOT OR COLD PACK TREAT MCARE;PT THER SUPP EA 15 MIN  -BS     Physical Therapy Interventions (Optional Details)  home exercise program;joint mobilization;manual therapy techniques;modalities;patient/family education;ROM (Range of Motion);strengthening;stretching  -BS     PT Plan Comments  continue w/ RTC and scapular strengthening.   -BS       User Key  (r) = Recorded By, (t) = Taken By, (c) = Cosigned By    Initials Name Provider Type    BS Masood Vargas, PT Physical Therapist          Modalities     Row Name 10/02/19 1500             Moist Heat    MH Applied  Yes  -CP      Location  left shoudler with IFC  -CP      Rx Minutes  15 mins  -CP      MH S/P Rx  Yes  -CP         ELECTRICAL STIMULATION    Attended/Unattended  Unattended  -CP      Stimulation Type  IFC  -CP      Location/Electrode Placement/Other  left shoulder with MHP  -CP       PT E-Stim Unattended (Manual) Minutes  15  -CP        User Key  (r) = Recorded By, (t) = Taken By, (c) = Cosigned By    Initials Name Provider Type    CP Gloria Campbell, PTA Physical Therapy Assistant        OP Exercises     Row Name 10/02/19 1500             Subjective Comments    Subjective Comments  Went to MD and MD is pleased but patient is unable to ride his 4 colon at least for one month.    -CP         Subjective Pain    Able to rate subjective pain?  yes  -CP      Pre-Treatment Pain Level  3  -CP      Post-Treatment Pain Level  3  -CP         Exercise 1    Exercise Name 1  Pro II level 3  -CP      Time 1  10  -CP      Additional Comments  FW/BW  -CP         Exercise 2    Exercise Name 2  pulleys fl and ab  -CP      Reps 2  30  -CP         Exercise 3    Exercise Name  3  recheck  -CP         Exercise 4    Exercise Name 4  seated wedge wipes  -CP      Cueing 4  Verbal  -CP      Reps 4  30  -CP      Time 4  1 lb   -CP         Exercise 5    Exercise Name 5  standing table ABC  -CP      Cueing 5  Verbal  -CP      Reps 5  2  -CP      Time 5  1 lb  -CP         Exercise 6    Exercise Name 6  SL AB to 90  -CP      Cueing 6  Verbal  -CP      Sets 6  2  -CP      Reps 6  10  -CP         Exercise 7    Exercise Name 7  SL'ing ER  -CP      Cueing 7  Verbal  -CP      Sets 7  2  -CP      Reps 7  10  -CP         Exercise 8    Exercise Name 8  PROM  -CP      Time 8  10  -CP         Exercise 9    Exercise Name 9  supine AAROM fl with cane  -CP      Cueing 9  Verbal  -CP      Sets 9  2  -CP      Reps 9  10  -CP      Time 9  2 lb cane  -CP        User Key  (r) = Recorded By, (t) = Taken By, (c) = Cosigned By    Initials Name Provider Type    CP Gloria Campbell, PTA Physical Therapy Assistant                                  Time Calculation:     Start Time: 1515  Stop Time: 1620  Time Calculation (min): 65 min  Total Timed Code Minutes- PT: 48 minute(s)     Therapy Charges for Today     Code Description Service Date Service Provider Modifiers Qty    13115898100 HC PT THER PROC EA 15 MIN 10/2/2019 Masood Vargas, PT GP 1                    Masood Vargas, PT  10/2/2019

## 2019-10-04 ENCOUNTER — HOSPITAL ENCOUNTER (OUTPATIENT)
Dept: PHYSICAL THERAPY | Facility: HOSPITAL | Age: 59
Setting detail: THERAPIES SERIES
Discharge: HOME | End: 2019-10-04

## 2019-10-04 DIAGNOSIS — S42.112A CLOSED DISPLACED FRACTURE OF BODY OF LEFT SCAPULA, INITIAL ENCOUNTER: ICD-10-CM

## 2019-10-04 DIAGNOSIS — M25.512 LEFT SHOULDER PAIN, UNSPECIFIED CHRONICITY: Primary | ICD-10-CM

## 2019-10-04 PROCEDURE — 97110 THERAPEUTIC EXERCISES: CPT

## 2019-10-04 PROCEDURE — G0283 ELEC STIM OTHER THAN WOUND: HCPCS

## 2019-10-04 NOTE — THERAPY TREATMENT NOTE
Outpatient Physical Therapy Ortho Treatment Note  [facility]     Patient Name: Crow Moffett  : 1960  MRN: [id number]  Today's Date: 10/4/2019      Visit Date: 10/04/2019     Subjective Improvement some  Visits 9/9  Visits approved medicare cap  RTMD next month  Recert Date 10-    Left scapular fx on 2019    Visit Dx:    ICD-10-CM ICD-9-CM   1. Left shoulder pain, unspecified chronicity M25.512 719.41   2. Closed displaced fracture of body of left scapula, initial encounter S42.112A 811.09       Patient Active Problem List   Diagnosis   • Left shoulder pain   • Closed displaced fracture of body of left scapula        Past Medical History:   Diagnosis Date   • Acute maxillary sinusitis    • Closed fracture of distal end of ulna    • Closed fracture of humerus    • Edema of upper extremity     left upper arm with redness    • Hoarse    • Polyuria    • Shoulder pain    • Slow urinary stream         Past Surgical History:   Procedure Laterality Date   • HUMERUS SURGERY  2013    Anesth, humerus surgery (OPen reduction and internal fixation with ulnar nerve transposition with triceps reflection. Procedure done under fluoroscopic control; final x-rays AP and oblique of the elbow maintained in medical record.)   • HUMERUS SURGERY  2013    Anesth, humerus surgery (Left humerus open reduction and internal fixation. Fractured right ulna distal shaft. fractured left humeral shaft.Procedure done under fluoroscopic control with multiple x-rays, fluoroscopy throughout the procedure.)   • INJECTION OF MEDICATION  2010    Kenalog (1)      • OTHER SURGICAL HISTORY  2014    Anesth, elbow area surgery (Removal of wire, screw and washer of the elbow. ECRB pronator teres transfer. Long & ring sublimis transfers to extensor tendons of thumb, index, long, ring & small-ring sublimis to long, ring & small fingers & long sublimis to thumb & index.)       PT Ortho     Row Name  10/02/19 1500       General ROM    GENERAL ROM COMMENTS  AROM: L shoulder-abd 81 deg ER 30 deg  -BS       MMT (Manual Muscle Testing)    General MMT Comments  MMT: L shoulder flex 4- abd 3+ ER 3+ IR 4+  -BS      User Key  (r) = Recorded By, (t) = Taken By, (c) = Cosigned By    Initials Name Provider Type    BS Masood Vargas, PT Physical Therapist                      PT Assessment/Plan     Row Name 10/04/19 1129          PT Assessment    Assessment Comments  Patient was TTP to left wrist and was advise to call MD if pain does not decrease.  Patient did have a previous injury to left shoulder which required surgery.  He does have nerve damage in Left UE.  -CP        PT Plan    PT Frequency  1x/week  -CP     Predicted Duration of Therapy Intervention (Therapy Eval)  4 weeks  -CP     PT Plan Comments  Cont with POC.  decreasing to one time a week.  -CP       User Key  (r) = Recorded By, (t) = Taken By, (c) = Cosigned By    Initials Name Provider Type    Gloria Adams, PTA Physical Therapy Assistant            OP Exercises     Row Name 10/04/19 1100             Subjective Comments    Subjective Comments  Reprots more pain in left wrist vs the shoulder.  States that his wife rolled over on his wrist last night.  -CP         Subjective Pain    Able to rate subjective pain?  yes  -CP      Pre-Treatment Pain Level  4  -CP      Post-Treatment Pain Level  4  -CP      Subjective Pain Comment  left shoulder and wrist  -CP         Exercise 1    Exercise Name 1  Pro II level 3  -CP      Time 1  10  -CP      Additional Comments  FW/BW  -CP         Exercise 2    Exercise Name 2  discussion concenning wrist pain and other concerns with left shoulder  -CP         Exercise 3    Exercise Name 3  PROM  -CP      Time 3  10  -CP         Exercise 4    Exercise Name 4  Supine AROM shoulder fl  -CP      Cueing 4  Verbal  -CP      Sets 4  2  -CP      Reps 4  10  -CP         Exercise 5    Exercise Name 5  chest press with cane  -CP       Cueing 5  Verbal  -CP      Sets 5  2  -CP      Reps 5  10  -CP      Time 5  2 lb cane  -CP         Exercise 6    Exercise Name 6  SL AB to 90  -CP      Cueing 6  Verbal  -CP      Sets 6  2  -CP      Reps 6  10  -CP        User Key  (r) = Recorded By, (t) = Taken By, (c) = Cosigned By    Initials Name Provider Type    CP Gloria Campbell, LINDA Physical Therapy Assistant                       PT OP Goals     Row Name 10/04/19 1100          PT Short Term Goals    STG Date to Achieve  09/19/19  -CP     STG 1  Pt indep with HEP   -CP     STG 1 Progress  Ongoing  -CP     STG 2  Improve L shoulder MMT to 3+/5  -CP     STG 2 Progress  Met  -CP     STG 3  Improve L shoulder abduction AAROM to 90°  -CP     STG 3 Progress  Ongoing  -CP        Long Term Goals    LTG Date to Achieve  10/03/19  -CP     LTG 1  Improve L shoulder abduction AROM to 120°  -CP     LTG 1 Progress  Ongoing  -CP     LTG 2  Improve L shoulder MMT to 4/5  -CP     LTG 2 Progress  Ongoing  -CP     LTG 3  Reduce L scapula/L shoulder pain by 50%  -CP     LTG 3 Progress  Met  -CP     LTG 4  Improve L shoulder ER AROM to 40°  -CP     LTG 4 Progress  Ongoing  -CP        Time Calculation    PT Goal Re-Cert Due Date  10/23/19  -CP       User Key  (r) = Recorded By, (t) = Taken By, (c) = Cosigned By    Initials Name Provider Type    CP Gloria Campbell PTA Physical Therapy Assistant                         Time Calculation:   Start Time: 1105  Stop Time: 1200  Time Calculation (min): 55 min  Total Timed Code Minutes- PT: 29 minute(s)  Therapy Charges for Today     Code Description Service Date Service Provider Modifiers Qty    09839022271 HC PT THER PROC EA 15 MIN 10/4/2019 Gloria Campbell, PTA GP 2    73794701609 HC PT ELECTRICAL STIM UNATTENDED 10/4/2019 Gloria Campbell, PTA  1                    Gloria Campbell PTA  10/4/2019

## 2019-10-07 ENCOUNTER — PREP FOR SURGERY (OUTPATIENT)
Dept: OTHER | Facility: HOSPITAL | Age: 59
End: 2019-10-07

## 2019-10-07 ENCOUNTER — CLINICAL SUPPORT (OUTPATIENT)
Dept: AUDIOLOGY | Facility: CLINIC | Age: 59
End: 2019-10-07

## 2019-10-07 ENCOUNTER — OFFICE VISIT (OUTPATIENT)
Dept: OTOLARYNGOLOGY | Facility: CLINIC | Age: 59
End: 2019-10-07

## 2019-10-07 VITALS — BODY MASS INDEX: 32.25 KG/M2 | HEIGHT: 68 IN | WEIGHT: 212.8 LBS | TEMPERATURE: 97.6 F

## 2019-10-07 DIAGNOSIS — J34.3 NASAL TURBINATE HYPERTROPHY: ICD-10-CM

## 2019-10-07 DIAGNOSIS — J34.2 NASAL SEPTAL DEVIATION: Primary | ICD-10-CM

## 2019-10-07 DIAGNOSIS — J34.89 NASAL OBSTRUCTION: Primary | ICD-10-CM

## 2019-10-07 DIAGNOSIS — J34.2 NASAL SEPTAL DEVIATION: ICD-10-CM

## 2019-10-07 DIAGNOSIS — H90.6 MIXED HEARING LOSS, BILATERAL: Primary | ICD-10-CM

## 2019-10-07 DIAGNOSIS — H74.01 TYMPANOSCLEROSIS INVOLVING TYMPANIC MEMBRANE ONLY, RIGHT: ICD-10-CM

## 2019-10-07 DIAGNOSIS — J34.89 NASAL VALVE COLLAPSE: ICD-10-CM

## 2019-10-07 PROBLEM — M95.0 NASAL VALVE COLLAPSE: Status: ACTIVE | Noted: 2019-10-07

## 2019-10-07 PROCEDURE — 99214 OFFICE O/P EST MOD 30 MIN: CPT | Performed by: OTOLARYNGOLOGY

## 2019-10-07 NOTE — PROGRESS NOTES
STANDARD AUDIOMETRIC EVALUATION      Name:  Crow Moffett  :  1960  Age:  59 y.o.  Date of Evaluation:  10/7/2019      HISTORY    Reason for visit:  Crow Moffett is seen today for a hearing test at the request of Dr. Travis Braun.  Patient reports his ears seem the same, and he has not noticed any changes in his hearing.      EVALUATION    See Audiogram    RESULTS        Otoscopy and Tympanometry 226 Hz :  Right Ear:  Otoscopy:  Clear ear canal          Tympanometry:  Middle ear function within normal limits    Left Ear:   Otoscopy:  Clear ear canal        Tympanometry:  Middle ear function within normal limits    Test technique:  Standard Audiometry     Pure Tone Audiometry:   Patient responded to pure tones at  dB for 250-8000 Hz in right ear, and at 15-85 dB for 250-8000 Hz in left ear.       Speech Audiometry:        Right Ear:  Speech Reception Threshold (SRT) was obtained at 25 dBHL                 Speech Discrimination scores were 92% in quiet when words were presented at 65 dBHL       Left Ear:  Speech Reception Threshold (SRT) was obtained at 20 dBHL                 Speech Discrimination scores were 96% in quiet when words were presented at 60 dBHL    Reliability:   good    IMPRESSIONS:  1.  Tympanometry results are consistent with Middle ear function within normal limits in both ears.  2.  Pure tone results are consistent with within normal limits to profound sloping mixed hearing loss for right ear, and within normal limits to severe sloping mixed hearing loss in left ear.       RECOMMENDATIONS:  Patient is seeing the Ear Nose and Throat physician immediately following this examination.  It was a pleasure seeing Crow Moffett in Audiology today.  We would be happy to do further testing or discuss these test as necessary.          This document has been electronically signed by Graciela Hein MS CCC-KARRIE on 2019 3:29 PM       Graciela Arguello  MS Angel Luis CCC-A  Licensed Audiologist

## 2019-10-08 NOTE — PROGRESS NOTES
Subjective   Crow Moffett is a 59 y.o. male.   Follow-up visit  History of Present Illness   Originally seen for ear problems as well as nasal problems more concerned with his nasal problem now is not have any drainage no change in hearing he has trouble breathing through his nose he has had previous nasal surgery but still has difficulty breathing in both sides nose left more than right.  Is been using his nasal spray but it does help some at night when he lays down but has trouble all the time he does not have any facial pain or pressure just more difficulty breathing through both sides of his nose      The following portions of the patient's history were reviewed and updated as appropriate: allergies, current medications, past family history, past medical history, past social history, past surgical history and problem list.      Crow Moffett reports that he has never smoked. He does not have any smokeless tobacco history on file. He reports that he drinks alcohol. He reports that he does not use drugs.  Patient is not a tobacco user and has not been counseled for use of tobacco products    History reviewed. No pertinent family history.      Current Outpatient Medications:   •  azelastine (ASTELIN) 0.1 % nasal spray, 2 sprays into the nostril(s) as directed by provider 2 (Two) Times a Day. Use in each nostril as directed, Disp: 30 mL, Rfl: 12  •  HYDROcodone-acetaminophen (NORCO) 5-325 MG per tablet, Take 1 tablet by mouth Every 6 (Six) Hours As Needed for Moderate Pain ., Disp: 40 tablet, Rfl: 0  •  [START ON 11/27/2106] ipratropium-albuterol (DUO-NEB) 0.5-2.5 mg/mL nebulizer, Take 3 mL by nebulization Every 4 (Four) Hours As Needed for wheezing., Disp: , Rfl:   •  traMADol (ULTRAM) 50 MG tablet, Take 1 tablet by mouth Every 4 (Four) Hours As Needed for Moderate Pain ., Disp: 30 tablet, Rfl: 0    No Known Allergies    Past Medical History:   Diagnosis Date   • Acute maxillary sinusitis    •  Closed fracture of distal end of ulna    • Closed fracture of humerus    • Edema of upper extremity     left upper arm with redness    • Hoarse    • Polyuria    • Shoulder pain    • Slow urinary stream        Past Surgical History:   Procedure Laterality Date   • HUMERUS SURGERY  05/30/2013    Anesth, humerus surgery (OPen reduction and internal fixation with ulnar nerve transposition with triceps reflection. Procedure done under fluoroscopic control; final x-rays AP and oblique of the elbow maintained in medical record.)   • HUMERUS SURGERY  05/11/2013    Anesth, humerus surgery (Left humerus open reduction and internal fixation. Fractured right ulna distal shaft. fractured left humeral shaft.Procedure done under fluoroscopic control with multiple x-rays, fluoroscopy throughout the procedure.)   • INJECTION OF MEDICATION  08/12/2010    Kenalog (1)      • OTHER SURGICAL HISTORY  02/27/2014    Anesth, elbow area surgery (Removal of wire, screw and washer of the elbow. ECRB pronator teres transfer. Long & ring sublimis transfers to extensor tendons of thumb, index, long, ring & small-ring sublimis to long, ring & small fingers & long sublimis to thumb & index.)       Review of Systems   Constitutional: Negative for fever.   HENT: Positive for congestion and hearing loss. Negative for sinus pressure and sinus pain.    Respiratory: Negative.    Cardiovascular: Negative.    Allergic/Immunologic: Positive for environmental allergies.   Hematological: Negative for adenopathy.           Objective   Physical Exam   Constitutional: He is oriented to person, place, and time. He appears well-developed and well-nourished.   HENT:   Head: Normocephalic and atraumatic.   Right Ear: Hearing, tympanic membrane, external ear and ear canal normal.   Left Ear: Hearing, tympanic membrane, external ear and ear canal normal.   Nose: Nasal deformity and septal deviation present. No mucosal edema or rhinorrhea. No epistaxis. Right sinus  exhibits no maxillary sinus tenderness and no frontal sinus tenderness. Left sinus exhibits no maxillary sinus tenderness and no frontal sinus tenderness.       Mouth/Throat: Uvula is midline and oropharynx is clear and moist. No trismus in the jaw. Normal dentition. No oropharyngeal exudate or posterior oropharyngeal edema.   Eyes: Conjunctivae are normal.   Neck: Neck supple. No JVD present. No tracheal deviation present. No thyromegaly present.   Cardiovascular: Normal rate and regular rhythm.   Pulmonary/Chest: Effort normal and breath sounds normal.   Musculoskeletal: Normal range of motion.   Lymphadenopathy:        Head (right side): No submental, no submandibular, no tonsillar, no preauricular, no posterior auricular and no occipital adenopathy present.        Head (left side): No submental, no submandibular, no tonsillar, no preauricular, no posterior auricular and no occipital adenopathy present.     He has no cervical adenopathy.        Right cervical: No superficial cervical, no deep cervical and no posterior cervical adenopathy present.       Left cervical: No superficial cervical, no deep cervical and no posterior cervical adenopathy present.   Neurological: He is alert and oriented to person, place, and time. No cranial nerve deficit.   Skin: Skin is warm.   Psychiatric: He has a normal mood and affect. His speech is normal and behavior is normal. Thought content normal.   Nursing note and vitals reviewed.            Assessment/Plan   Crow was seen today for follow-up.    Diagnoses and all orders for this visit:    Nasal septal deviation    Tympanosclerosis involving tympanic membrane only, right    Nasal turbinate hypertrophy    Nasal valve collapse    Plan is for nasal septal revision bilateral inferior turbinate surgery and nasal valve repair  he denies prior bleeding or anesthesia problems.  He does not want to discontinue the nasal spray wants to consider surgery.  We discussed the recovery  what was involved limitations after surgery failure rate and other complications such as bleeding, infection, scarring, change in appearance, need for further surgery, failure to improve, decreased sense of smell, septal perforation, chronic crusting, of the nose he wants to go ahead with surgery is been scheduled the near future  We will continue the Astelin the meantime is to avoid any anti-inflammatory drugs prior to surgery

## 2019-10-09 ENCOUNTER — APPOINTMENT (OUTPATIENT)
Dept: PHYSICAL THERAPY | Facility: HOSPITAL | Age: 59
End: 2019-10-09

## 2019-10-10 ENCOUNTER — HOSPITAL ENCOUNTER (OUTPATIENT)
Dept: PHYSICAL THERAPY | Facility: HOSPITAL | Age: 59
Setting detail: THERAPIES SERIES
Discharge: HOME | End: 2019-10-10

## 2019-10-10 DIAGNOSIS — M25.512 LEFT SHOULDER PAIN, UNSPECIFIED CHRONICITY: Primary | ICD-10-CM

## 2019-10-10 DIAGNOSIS — S42.112A CLOSED DISPLACED FRACTURE OF BODY OF LEFT SCAPULA, INITIAL ENCOUNTER: ICD-10-CM

## 2019-10-10 PROCEDURE — G0283 ELEC STIM OTHER THAN WOUND: HCPCS

## 2019-10-10 PROCEDURE — 97110 THERAPEUTIC EXERCISES: CPT

## 2019-10-10 NOTE — THERAPY TREATMENT NOTE
"    Outpatient Physical Therapy Ortho Treatment Note  [facility]     Patient Name: Crow Moffett  : 1960  MRN: [id number]  Today's Date: 10/10/2019      Visit Date: 10/10/2019   Attendance: 10/10  Subjective improvement:\"Some\"  Recert: 10/23/19  MD Appointment: 10/30/19      Visit Dx:    ICD-10-CM ICD-9-CM   1. Left shoulder pain, unspecified chronicity M25.512 719.41   2. Closed displaced fracture of body of left scapula, initial encounter S42.112A 811.09       Patient Active Problem List   Diagnosis   • Left shoulder pain   • Closed displaced fracture of body of left scapula   • Nasal obstruction   • Nasal septal deviation   • Nasal valve collapse   • Nasal turbinate hypertrophy        Past Medical History:   Diagnosis Date   • Acute maxillary sinusitis    • Closed fracture of distal end of ulna    • Closed fracture of humerus    • Edema of upper extremity     left upper arm with redness    • Hoarse    • Polyuria    • Shoulder pain    • Slow urinary stream         Past Surgical History:   Procedure Laterality Date   • HUMERUS SURGERY  2013    Anesth, humerus surgery (OPen reduction and internal fixation with ulnar nerve transposition with triceps reflection. Procedure done under fluoroscopic control; final x-rays AP and oblique of the elbow maintained in medical record.)   • HUMERUS SURGERY  2013    Anesth, humerus surgery (Left humerus open reduction and internal fixation. Fractured right ulna distal shaft. fractured left humeral shaft.Procedure done under fluoroscopic control with multiple x-rays, fluoroscopy throughout the procedure.)   • INJECTION OF MEDICATION  2010    Kenalog (1)      • OTHER SURGICAL HISTORY  2014    Anesth, elbow area surgery (Removal of wire, screw and washer of the elbow. ECRB pronator teres transfer. Long & ring sublimis transfers to extensor tendons of thumb, index, long, ring & small-ring sublimis to long, ring & small fingers & long " sublimis to thumb & index.)       PT Ortho     Row Name 10/10/19 1500       General ROM    GENERAL ROM COMMENTS  L Shoulder PROM: Flex: 138, Abd: 105, IR: 62, ER: 30  -EM      User Key  (r) = Recorded By, (t) = Taken By, (c) = Cosigned By    Initials Name Provider Type    Ady Ibanez PTA Physical Therapy Assistant                      PT Assessment/Plan     Row Name 10/10/19 1600          PT Assessment    Assessment Comments  Pt tyler tx well with improved abd, flex, and IR PROM measurments since last measured.   -EM        PT Plan    PT Frequency  1x/week  -EM     Predicted Duration of Therapy Intervention (Therapy Eval)  4 wks  -EM     PT Plan Comments  Cont current POC with gentle progerssion as tyler  -EM       User Key  (r) = Recorded By, (t) = Taken By, (c) = Cosigned By    Initials Name Provider Type    Ady Ibanez PTA Physical Therapy Assistant          Modalities     Row Name 10/10/19 1500             Moist Heat    MH Applied  --  -EM         ELECTRICAL STIMULATION    Attended/Unattended  Unattended  -EM      Stimulation Type  IFC w/ MHP  -EM      Max mAmp  7  -EM      Location/Electrode Placement/Other  L Shoulder 20'  -EM        User Key  (r) = Recorded By, (t) = Taken By, (c) = Cosigned By    Initials Name Provider Type    Ady Ibanez PTA Physical Therapy Assistant        OP Exercises     Row Name 10/10/19 1500             Subjective Comments    Subjective Comments  Pt reports he is doing good. Pt is eager to return to his normal hobbies of fishing.   -EM         Subjective Pain    Able to rate subjective pain?  yes  -EM      Pre-Treatment Pain Level  3  -EM      Post-Treatment Pain Level  3  -EM         Exercise 1    Exercise Name 1  PRO II-4.0  -EM      Time 1  10'  -EM         Exercise 2    Exercise Name 2  3 Way Shoulder Pulley  -EM      Sets 2  1  -EM      Reps 2  20  -EM         Exercise 3    Exercise Name 3  Sup AROM: Flex  -EM      Sets 3  1  -EM      Reps 3  20  -EM          Exercise 4    Exercise Name 4  Sup AROM: Abd  -EM      Sets 4  1  -EM      Reps 4  20  -EM         Exercise 5    Exercise Name 5  PROM  -EM      Time 5  12'  -EM         Exercise 6    Exercise Name 6  IFC w/ MHP  -EM      Time 6  20'  -EM        User Key  (r) = Recorded By, (t) = Taken By, (c) = Cosigned By    Initials Name Provider Type    Ady Ibanez, LINDA Physical Therapy Assistant                      Manual Rx (last 36 hours)      Manual Treatments     Row Name 10/10/19 1500             Manual Rx 1    Manual Rx 1 Location  L shoulder  -EM      Manual Rx 1 Type  PROM: Flex, Abd, IR, ER  -EM      Manual Rx 1 Duration  10'  -EM        User Key  (r) = Recorded By, (t) = Taken By, (c) = Cosigned By    Initials Name Provider Type    Ady Ibanez, LINDA Physical Therapy Assistant          PT OP Goals     Row Name 10/10/19 1500          PT Short Term Goals    STG Date to Achieve  09/19/19  -EM     STG 1  Pt indep with HEP   -EM     STG 1 Progress  Ongoing  -EM     STG 2  Improve L shoulder MMT to 3+/5  -EM     STG 2 Progress  Met  (Significant)   -EM     STG 3  Improve L shoulder abduction AAROM to 90°  -EM     STG 3 Progress  Ongoing  -EM        Long Term Goals    LTG Date to Achieve  10/03/19  -EM     LTG 1  Improve L shoulder abduction AROM to 120°  -EM     LTG 1 Progress  Ongoing  -EM     LTG 2  Improve L shoulder MMT to 4/5  -EM     LTG 2 Progress  Ongoing  -EM     LTG 3  Reduce L scapula/L shoulder pain by 50%  -EM     LTG 3 Progress  Met  (Significant)   -EM     LTG 4  Improve L shoulder ER AROM to 40°  -EM     LTG 4 Progress  Ongoing  -EM        Time Calculation    PT Goal Re-Cert Due Date  10/13/19  -EM       User Key  (r) = Recorded By, (t) = Taken By, (c) = Cosigned By    Initials Name Provider Type    Ady Ibanez PTA Physical Therapy Assistant                         Time Calculation:   Start Time: 1519  Stop Time: 1625  Time Calculation (min): 66 min  Total Timed Code Minutes- PT: 66  minute(s)  Therapy Charges for Today     Code Description Service Date Service Provider Modifiers Qty    88157990615  PT THER PROC EA 15 MIN 10/10/2019 Ady Rajan, PTA GP 3    [id number]  PT ELECTRICAL STIM UNATTENDED 10/10/2019 Ady Rajan, PTA  1                    Ady Rajan, PTA  10/10/2019

## 2019-10-18 ENCOUNTER — APPOINTMENT (OUTPATIENT)
Dept: PREADMISSION TESTING | Facility: HOSPITAL | Age: 59
End: 2019-10-18

## 2019-10-18 VITALS
HEART RATE: 77 BPM | SYSTOLIC BLOOD PRESSURE: 123 MMHG | DIASTOLIC BLOOD PRESSURE: 72 MMHG | OXYGEN SATURATION: 97 % | RESPIRATION RATE: 16 BRPM

## 2019-10-18 RX ORDER — SODIUM CHLORIDE, SODIUM GLUCONATE, SODIUM ACETATE, POTASSIUM CHLORIDE, AND MAGNESIUM CHLORIDE 526; 502; 368; 37; 30 MG/100ML; MG/100ML; MG/100ML; MG/100ML; MG/100ML
1000 INJECTION, SOLUTION INTRAVENOUS CONTINUOUS
Status: CANCELLED | OUTPATIENT
Start: 2019-10-22

## 2019-10-21 ENCOUNTER — ANESTHESIA EVENT (OUTPATIENT)
Dept: PERIOP | Facility: HOSPITAL | Age: 59
End: 2019-10-21

## 2019-10-22 ENCOUNTER — ANESTHESIA (OUTPATIENT)
Dept: PERIOP | Facility: HOSPITAL | Age: 59
End: 2019-10-22

## 2019-10-22 ENCOUNTER — HOSPITAL ENCOUNTER (OUTPATIENT)
Facility: HOSPITAL | Age: 59
Setting detail: HOSPITAL OUTPATIENT SURGERY
Discharge: HOME | End: 2019-10-22
Attending: OTOLARYNGOLOGY | Admitting: OTOLARYNGOLOGY

## 2019-10-22 VITALS
BODY MASS INDEX: 32.01 KG/M2 | WEIGHT: 211.2 LBS | HEIGHT: 68 IN | DIASTOLIC BLOOD PRESSURE: 72 MMHG | TEMPERATURE: 98.5 F | RESPIRATION RATE: 18 BRPM | OXYGEN SATURATION: 93 % | SYSTOLIC BLOOD PRESSURE: 136 MMHG | HEART RATE: 76 BPM

## 2019-10-22 PROCEDURE — 30130 EXCISE INFERIOR TURBINATE: CPT | Performed by: OTOLARYNGOLOGY

## 2019-10-22 PROCEDURE — 94640 AIRWAY INHALATION TREATMENT: CPT

## 2019-10-22 PROCEDURE — 30465 REPAIR NASAL STENOSIS: CPT | Performed by: OTOLARYNGOLOGY

## 2019-10-22 PROCEDURE — 25010000002 PHENYLEPHRINE PER 1 ML: Performed by: NURSE ANESTHETIST, CERTIFIED REGISTERED

## 2019-10-22 PROCEDURE — 25010000002 PROPOFOL 10 MG/ML EMULSION: Performed by: NURSE ANESTHETIST, CERTIFIED REGISTERED

## 2019-10-22 PROCEDURE — 25010000002 MIDAZOLAM PER 1 MG: Performed by: NURSE ANESTHETIST, CERTIFIED REGISTERED

## 2019-10-22 PROCEDURE — 25010000002 FENTANYL CITRATE (PF) 100 MCG/2ML SOLUTION: Performed by: NURSE ANESTHETIST, CERTIFIED REGISTERED

## 2019-10-22 PROCEDURE — 25010000002 SUCCINYLCHOLINE PER 20 MG: Performed by: NURSE ANESTHETIST, CERTIFIED REGISTERED

## 2019-10-22 RX ORDER — LABETALOL HYDROCHLORIDE 5 MG/ML
5 INJECTION, SOLUTION INTRAVENOUS
Status: DISCONTINUED | OUTPATIENT
Start: 2019-10-22 | End: 2019-10-22 | Stop reason: HOSPADM

## 2019-10-22 RX ORDER — MELOXICAM 15 MG/1
7.5 TABLET ORAL ONCE AS NEEDED
Status: CANCELLED | OUTPATIENT
Start: 2019-10-22

## 2019-10-22 RX ORDER — EPHEDRINE SULFATE 50 MG/ML
INJECTION, SOLUTION INTRAVENOUS AS NEEDED
Status: DISCONTINUED | OUTPATIENT
Start: 2019-10-22 | End: 2019-10-22 | Stop reason: SURG

## 2019-10-22 RX ORDER — LIDOCAINE HYDROCHLORIDE 20 MG/ML
INJECTION, SOLUTION INFILTRATION; PERINEURAL AS NEEDED
Status: DISCONTINUED | OUTPATIENT
Start: 2019-10-22 | End: 2019-10-22 | Stop reason: SURG

## 2019-10-22 RX ORDER — SUCCINYLCHOLINE CHLORIDE 20 MG/ML
INJECTION INTRAMUSCULAR; INTRAVENOUS AS NEEDED
Status: DISCONTINUED | OUTPATIENT
Start: 2019-10-22 | End: 2019-10-22 | Stop reason: SURG

## 2019-10-22 RX ORDER — FLUMAZENIL 0.1 MG/ML
0.2 INJECTION INTRAVENOUS AS NEEDED
Status: DISCONTINUED | OUTPATIENT
Start: 2019-10-22 | End: 2019-10-22 | Stop reason: HOSPADM

## 2019-10-22 RX ORDER — LIDOCAINE HYDROCHLORIDE AND EPINEPHRINE 10; 10 MG/ML; UG/ML
INJECTION, SOLUTION INFILTRATION; PERINEURAL AS NEEDED
Status: DISCONTINUED | OUTPATIENT
Start: 2019-10-22 | End: 2019-10-22 | Stop reason: HOSPADM

## 2019-10-22 RX ORDER — HYDROCODONE BITARTRATE AND ACETAMINOPHEN 7.5; 325 MG/1; MG/1
1 TABLET ORAL ONCE AS NEEDED
Status: CANCELLED | OUTPATIENT
Start: 2019-10-22 | End: 2019-11-01

## 2019-10-22 RX ORDER — PROMETHAZINE HYDROCHLORIDE 25 MG/ML
12.5 INJECTION, SOLUTION INTRAMUSCULAR; INTRAVENOUS ONCE AS NEEDED
Status: DISCONTINUED | OUTPATIENT
Start: 2019-10-22 | End: 2019-10-22 | Stop reason: HOSPADM

## 2019-10-22 RX ORDER — FENTANYL CITRATE 50 UG/ML
INJECTION, SOLUTION INTRAMUSCULAR; INTRAVENOUS AS NEEDED
Status: DISCONTINUED | OUTPATIENT
Start: 2019-10-22 | End: 2019-10-22 | Stop reason: SURG

## 2019-10-22 RX ORDER — PROMETHAZINE HYDROCHLORIDE 25 MG/1
25 SUPPOSITORY RECTAL ONCE AS NEEDED
Status: DISCONTINUED | OUTPATIENT
Start: 2019-10-22 | End: 2019-10-22 | Stop reason: HOSPADM

## 2019-10-22 RX ORDER — ALBUTEROL SULFATE 2.5 MG/3ML
2.5 SOLUTION RESPIRATORY (INHALATION) ONCE
Status: COMPLETED | OUTPATIENT
Start: 2019-10-22 | End: 2019-10-22

## 2019-10-22 RX ORDER — NALOXONE HCL 0.4 MG/ML
0.4 VIAL (ML) INJECTION AS NEEDED
Status: DISCONTINUED | OUTPATIENT
Start: 2019-10-22 | End: 2019-10-22 | Stop reason: HOSPADM

## 2019-10-22 RX ORDER — DIPHENHYDRAMINE HYDROCHLORIDE 50 MG/ML
12.5 INJECTION INTRAMUSCULAR; INTRAVENOUS
Status: DISCONTINUED | OUTPATIENT
Start: 2019-10-22 | End: 2019-10-22 | Stop reason: HOSPADM

## 2019-10-22 RX ORDER — SODIUM CHLORIDE, SODIUM GLUCONATE, SODIUM ACETATE, POTASSIUM CHLORIDE, AND MAGNESIUM CHLORIDE 526; 502; 368; 37; 30 MG/100ML; MG/100ML; MG/100ML; MG/100ML; MG/100ML
1000 INJECTION, SOLUTION INTRAVENOUS CONTINUOUS
Status: DISCONTINUED | OUTPATIENT
Start: 2019-10-22 | End: 2019-10-22 | Stop reason: HOSPADM

## 2019-10-22 RX ORDER — OXYMETAZOLINE HYDROCHLORIDE 0.05 G/100ML
SPRAY NASAL AS NEEDED
Status: DISCONTINUED | OUTPATIENT
Start: 2019-10-22 | End: 2019-10-22 | Stop reason: HOSPADM

## 2019-10-22 RX ORDER — PROPOFOL 10 MG/ML
VIAL (ML) INTRAVENOUS AS NEEDED
Status: DISCONTINUED | OUTPATIENT
Start: 2019-10-22 | End: 2019-10-22 | Stop reason: SURG

## 2019-10-22 RX ORDER — ONDANSETRON 2 MG/ML
4 INJECTION INTRAMUSCULAR; INTRAVENOUS ONCE AS NEEDED
Status: DISCONTINUED | OUTPATIENT
Start: 2019-10-22 | End: 2019-10-22 | Stop reason: HOSPADM

## 2019-10-22 RX ORDER — CEFDINIR 300 MG/1
300 CAPSULE ORAL 2 TIMES DAILY
Qty: 20 CAPSULE | Refills: 0 | Status: SHIPPED | OUTPATIENT
Start: 2019-10-22 | End: 2020-04-06

## 2019-10-22 RX ORDER — ACETAMINOPHEN 650 MG/1
650 SUPPOSITORY RECTAL ONCE AS NEEDED
Status: DISCONTINUED | OUTPATIENT
Start: 2019-10-22 | End: 2019-10-22 | Stop reason: HOSPADM

## 2019-10-22 RX ORDER — MIDAZOLAM HYDROCHLORIDE 1 MG/ML
INJECTION INTRAMUSCULAR; INTRAVENOUS AS NEEDED
Status: DISCONTINUED | OUTPATIENT
Start: 2019-10-22 | End: 2019-10-22 | Stop reason: SURG

## 2019-10-22 RX ORDER — EPHEDRINE SULFATE 50 MG/ML
5 INJECTION, SOLUTION INTRAVENOUS ONCE AS NEEDED
Status: DISCONTINUED | OUTPATIENT
Start: 2019-10-22 | End: 2019-10-22 | Stop reason: HOSPADM

## 2019-10-22 RX ORDER — ROCURONIUM BROMIDE 10 MG/ML
INJECTION, SOLUTION INTRAVENOUS AS NEEDED
Status: DISCONTINUED | OUTPATIENT
Start: 2019-10-22 | End: 2019-10-22 | Stop reason: SURG

## 2019-10-22 RX ORDER — ACETAMINOPHEN 325 MG/1
650 TABLET ORAL ONCE AS NEEDED
Status: DISCONTINUED | OUTPATIENT
Start: 2019-10-22 | End: 2019-10-22 | Stop reason: HOSPADM

## 2019-10-22 RX ORDER — PROMETHAZINE HYDROCHLORIDE 25 MG/1
25 TABLET ORAL ONCE AS NEEDED
Status: DISCONTINUED | OUTPATIENT
Start: 2019-10-22 | End: 2019-10-22 | Stop reason: HOSPADM

## 2019-10-22 RX ORDER — HYDROCODONE BITARTRATE AND ACETAMINOPHEN 7.5; 325 MG/1; MG/1
1 TABLET ORAL EVERY 4 HOURS PRN
Qty: 20 TABLET | Refills: 0 | Status: SHIPPED | OUTPATIENT
Start: 2019-10-22

## 2019-10-22 RX ADMIN — PHENYLEPHRINE HYDROCHLORIDE 100 MCG: 10 INJECTION INTRAVENOUS at 08:54

## 2019-10-22 RX ADMIN — ALBUTEROL SULFATE 2.5 MG: 2.5 SOLUTION RESPIRATORY (INHALATION) at 07:44

## 2019-10-22 RX ADMIN — ALBUTEROL SULFATE 2.5 MG: 2.5 SOLUTION RESPIRATORY (INHALATION) at 09:45

## 2019-10-22 RX ADMIN — LIDOCAINE HYDROCHLORIDE 80 MG: 20 INJECTION, SOLUTION INFILTRATION; PERINEURAL at 08:22

## 2019-10-22 RX ADMIN — ROCURONIUM BROMIDE 5 MG: 10 INJECTION INTRAVENOUS at 08:23

## 2019-10-22 RX ADMIN — FENTANYL CITRATE 50 MCG: 50 INJECTION, SOLUTION INTRAMUSCULAR; INTRAVENOUS at 08:23

## 2019-10-22 RX ADMIN — MIDAZOLAM HYDROCHLORIDE 1 MG: 2 INJECTION, SOLUTION INTRAMUSCULAR; INTRAVENOUS at 08:13

## 2019-10-22 RX ADMIN — EPHEDRINE SULFATE 10 MG: 50 INJECTION INTRAVENOUS at 08:54

## 2019-10-22 RX ADMIN — SODIUM CHLORIDE, SODIUM GLUCONATE, SODIUM ACETATE, POTASSIUM CHLORIDE, AND MAGNESIUM CHLORIDE 1000 ML: 526; 502; 368; 37; 30 INJECTION, SOLUTION INTRAVENOUS at 07:28

## 2019-10-22 RX ADMIN — PHENYLEPHRINE HYDROCHLORIDE 100 MCG: 10 INJECTION INTRAVENOUS at 08:59

## 2019-10-22 RX ADMIN — EPHEDRINE SULFATE 10 MG: 50 INJECTION INTRAVENOUS at 08:57

## 2019-10-22 RX ADMIN — PHENYLEPHRINE HYDROCHLORIDE 200 MCG: 10 INJECTION INTRAVENOUS at 09:10

## 2019-10-22 RX ADMIN — PHENYLEPHRINE HYDROCHLORIDE 100 MCG: 10 INJECTION INTRAVENOUS at 08:49

## 2019-10-22 RX ADMIN — PHENYLEPHRINE HYDROCHLORIDE 100 MCG: 10 INJECTION INTRAVENOUS at 08:57

## 2019-10-22 RX ADMIN — SUCCINYLCHOLINE CHLORIDE 140 MG: 20 INJECTION, SOLUTION INTRAMUSCULAR; INTRAVENOUS at 08:23

## 2019-10-22 RX ADMIN — PROPOFOL 150 MG: 10 INJECTION, EMULSION INTRAVENOUS at 08:23
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
## 2019-10-22 NOTE — DISCHARGE INSTRUCTIONS
Begin at least tid nasal saline irrigation in am  Use at least 1 botle each time  No strenuous activity , bending or lifting or nose blowing  Call if T>100.5  Expect some mild intermittent oozing , but not continuous dripping

## 2019-10-22 NOTE — ANESTHESIA PROCEDURE NOTES
Airway  Urgency: elective    Date/Time: 10/22/2019 8:30 AM  Airway not difficult    General Information and Staff    Patient location during procedure: OR  CRNA: Lulu Leyva CRNA    Indications and Patient Condition  Indications for airway management: airway protection    Preoxygenated: yes  Mask difficulty assessment: 2 - vent by mask + OA or adjuvant +/- NMBA    Final Airway Details  Final airway type: endotracheal airway      Successful airway: ZACHARY tube    Successful intubation technique: direct laryngoscopy  Facilitating devices/methods: cricoid pressure  Endotracheal tube insertion site: oral  Blade: Aida  Blade size: 3  Cormack-Lehane Classification: grade I - full view of glottis  Placement verified by: chest auscultation and capnometry   Measured from: gums  ETT/EBT to gums (cm): 20  Number of attempts at approach: 2  Assessment: atraumatic intubation
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
## 2019-10-22 NOTE — INTERVAL H&P NOTE
I have seen the patient and there are no significant medical changes.    All questions were answered.    Temp:  [97.3 °F (36.3 °C)] 97.3 °F (36.3 °C)  Heart Rate:  [76] 76  Resp:  [17] 17  BP: (174)/(82) 174/82

## 2019-10-22 NOTE — ANESTHESIA POSTPROCEDURE EVALUATION
Patient: Crow Moffett    Procedure Summary     Date:  10/22/19 Room / Location:  [facility] OR  / [facility] OR    Anesthesia Start:  0748 Anesthesia Stop:  0944    Procedure:  NASAL VALVE REPIR , NASAL SEPTAL REPAIR AND BILATERAL TURBINATE SURGERY (N/A ) Diagnosis:       Nasal obstruction      Nasal septal deviation      Nasal valve collapse      Nasal turbinate hypertrophy      (Nasal obstruction [J34.89])      (Nasal septal deviation [J34.2])      (Nasal valve collapse [J34.89])      (Nasal turbinate hypertrophy [J34.3])    Surgeon:  Travis Braun MD Provider:  Mando Meek MD    Anesthesia Type:  general ASA Status:  3          Anesthesia Type: general  Last vitals  BP   174/82 (10/22/19 0726)   Temp   97.3 °F (36.3 °C) (10/22/19 0726)   Pulse   76 (10/22/19 0726)   Resp   17 (10/22/19 0726)     SpO2   96 % (10/22/19 0726)     Post Anesthesia Care and Evaluation    Patient location during evaluation: PACU  Patient participation: complete - patient participated  Level of consciousness: awake and alert  Pain management: adequate  Airway patency: patent  Anesthetic complications: No anesthetic complications    Cardiovascular status: acceptable  Respiratory status: acceptable  Hydration status: acceptable

## 2019-10-24 ENCOUNTER — APPOINTMENT (OUTPATIENT)
Dept: PHYSICAL THERAPY | Facility: HOSPITAL | Age: 59
End: 2019-10-24

## 2019-10-28 ENCOUNTER — HOSPITAL ENCOUNTER (OUTPATIENT)
Dept: PHYSICAL THERAPY | Facility: HOSPITAL | Age: 59
Setting detail: THERAPIES SERIES
Discharge: HOME | End: 2019-10-28

## 2019-10-28 ENCOUNTER — OFFICE VISIT (OUTPATIENT)
Dept: OTOLARYNGOLOGY | Facility: CLINIC | Age: 59
End: 2019-10-28

## 2019-10-28 VITALS — WEIGHT: 210 LBS | BODY MASS INDEX: 31.83 KG/M2 | HEIGHT: 68 IN

## 2019-10-28 DIAGNOSIS — S42.112A CLOSED DISPLACED FRACTURE OF BODY OF LEFT SCAPULA, INITIAL ENCOUNTER: ICD-10-CM

## 2019-10-28 DIAGNOSIS — Z09 POSTOP CHECK: Primary | ICD-10-CM

## 2019-10-28 DIAGNOSIS — M25.512 LEFT SHOULDER PAIN, UNSPECIFIED CHRONICITY: Primary | ICD-10-CM

## 2019-10-28 PROCEDURE — 99024 POSTOP FOLLOW-UP VISIT: CPT | Performed by: OTOLARYNGOLOGY

## 2019-10-28 PROCEDURE — 97110 THERAPEUTIC EXERCISES: CPT | Performed by: PHYSICAL THERAPIST

## 2019-10-28 NOTE — PATIENT INSTRUCTIONS

## 2019-10-28 NOTE — PROGRESS NOTES
Patient comes back in follow-up he not been using rinses as recommended he has a large amount of crusting which I removed and removed his splints is healing properly other than more irritation I expect because of the debris.  I suctioned out removed his splint today tolerated well discussed activity limitations he is Jw been hit once by his dog tail no symptoms got to be very careful with that  This could cause compromise of the surgery.  We will see him back in follow-up in 2 weeks we will continue irrigation I explained to him how to use ointment

## 2019-10-29 NOTE — THERAPY PROGRESS REPORT/RE-CERT
Outpatient Physical Therapy Ortho Progress Note  [facility]     Patient Name: Crow Moffett  : 1960  MRN: [id number]  Today's Date: 10/28/2019      Visit Date: 10/28/2019  Attendance:  (15 visits on secondary)  Subjective Improvement: 80-90%  Next MD Appt: 10/23/19  Recert Date: 19      Changes in Medications: none noted  Changes in MD Orders: restrictions from ENT of no forward bending and no lifting > 10#  Number of Work Days Lost: since injury       Past Medical History:   Diagnosis Date   • Acute maxillary sinusitis    • Arthritis    • Closed fracture of distal end of ulna    • Closed fracture of humerus    • Concussion    • Edema of upper extremity     left upper arm with redness    • Hoarse    • Polyuria    • Shoulder pain    • Slow urinary stream         Past Surgical History:   Procedure Laterality Date   • HUMERUS SURGERY  2013    Anesth, humerus surgery (OPen reduction and internal fixation with ulnar nerve transposition with triceps reflection. Procedure done under fluoroscopic control; final x-rays AP and oblique of the elbow maintained in medical record.)   • HUMERUS SURGERY  2013    Anesth, humerus surgery (Left humerus open reduction and internal fixation. Fractured right ulna distal shaft. fractured left humeral shaft.Procedure done under fluoroscopic control with multiple x-rays, fluoroscopy throughout the procedure.)   • INJECTION OF MEDICATION  2010    Kenalog (1)      • NASAL SEPTOPLASTY W/ TURBINOPLASTY N/A 10/22/2019    Procedure: NASAL VALVE REPIR , NASAL SEPTAL REPAIR AND BILATERAL TURBINATE SURGERY;  Surgeon: Travis Braun MD;  Location: [facility] OR;  Service: ENT   • OTHER SURGICAL HISTORY  2014    Anesth, elbow area surgery (Removal of wire, screw and washer of the elbow. ECRB pronator teres transfer. Long & ring sublimis transfers to extensor tendons of thumb, index, long, ring & small-ring sublimis to long, ring & small  "fingers & long sublimis to thumb & index.)       Visit Dx:     ICD-10-CM ICD-9-CM   1. Left shoulder pain, unspecified chronicity M25.512 719.41   2. Closed displaced fracture of body of left scapula, initial encounter S42.112A 811.09             PT Ortho     Row Name 10/28/19 1300       Subjective Comments    Subjective Comments  Shoulder is \"pretty decent.\" \"I can deal with it.\" Movement is \"pretty good.\" Patient underwent surgery on 10/22/19 by Dr. Braun to correct deviated nasal septum and nasal valve repair. He is currently under restrictions of not bending over and no lifting > 10#. No medication changes. 80-90% subjective improvement.   -SS       Subjective Pain    Able to rate subjective pain?  yes  -SS    Pre-Treatment Pain Level  3  -SS       Left Upper Ext    Lt Shoulder Abduction AROM  70 standing, 100 R sidelying  -SS    Lt Shoulder Extension AROM  30  -SS    Lt Shoulder Flexion AROM  80 standing; 110 supine  -SS    Lt Shoulder External Rotation AROM  20, standing neutral; Functional ER: hand to mouth  -SS    Lt Shoulder Internal Rotation AROM  45, standing neutral; Functional IR: hand to greater trochanter  -SS      User Key  (r) = Recorded By, (t) = Taken By, (c) = Cosigned By    Initials Name Provider Type    Eric Iniguez, PT DPT Physical Therapist          Therapy Education  Education Details: therapy plan  How Provided: Verbal  Provided to: Patient  Level of Understanding: Verbalized     PT OP Goals     Row Name 10/28/19 1300          PT Short Term Goals    STG Date to Achieve  -- further deferred  -SS     STG 1  Pt indep with HEP   -SS     STG 1 Progress  Ongoing  -SS     STG 2  Improve L shoulder MMT to 3+/5  -SS     STG 2 Progress  Met  -SS     STG 3  Improve L shoulder abduction AAROM to 90°  -SS     STG 3 Progress  --  -SS     STG 3 Progress Comments  not assessed this date  -SS        Long Term Goals    LTG Date to Achieve  11/18/19  -     LTG 1  Improve L shoulder abduction " "AROM to 120°  -     LTG 1 Progress  Not Met  -     LTG 2  Improve L shoulder MMT to 4/5  -     LTG 2 Progress  Ongoing  -     LTG 3  Reduce L scapula/L shoulder pain by 50%  -     LTG 3 Progress  Met  -     LTG 4  Improve L shoulder ER AROM to 40°  -     LTG 4 Progress  Ongoing  -        Time Calculation    PT Goal Re-Cert Due Date  11/18/19  -       User Key  (r) = Recorded By, (t) = Taken By, (c) = Cosigned By    Initials Name Provider Type     Eric Hein, PT DPT Physical Therapist          PT Assessment/Plan     Row Name 10/28/19 1300          PT Assessment    Functional Limitations  Limitations in functional capacity and performance;Performance in leisure activities;Performance in work activities  -     Impairments  Joint mobility;Muscle strength;Range of motion  -     Assessment Comments  Patient's AROM is essentially what it was prior to his most recent injury at his report. Patient would benefit from additional UE strengthening.  -     Rehab Potential  Good  -     Patient/caregiver participated in establishment of treatment plan and goals  Yes  -     Patient would benefit from skilled therapy intervention  Yes  -SS        PT Plan    PT Frequency  1x/week;2x/week  -     Predicted Duration of Therapy Intervention (Therapy Eval)  3 weeks  -     PT Plan Comments  Shoulder and scapular muscle strengthening  -       User Key  (r) = Recorded By, (t) = Taken By, (c) = Cosigned By    Initials Name Provider Type    Eric Iniguez, PT DPT Physical Therapist            OP Exercises     Row Name 10/28/19 1300             Subjective Comments    Subjective Comments  Shoulder is \"pretty decent.\" \"I can deal with it.\" Movement is \"pretty good.\" Patient underwent surgery on 10/22/19 by Dr. Braun to correct deviated nasal septum and nasal valve repair. He is currently under restrictions of not bending over and no lifting > 10#. No medication changes. 80-90% subjective " improvement.   -SS         Subjective Pain    Able to rate subjective pain?  yes  -SS      Pre-Treatment Pain Level  3  -SS      Post-Treatment Pain Level  3  -SS         Exercise 1    Exercise Name 1  recheck/re-evaluation  -SS         Exercise 2    Exercise Name 2  Supine DB shoulder flexion  -SS      Cueing 2  Verbal  -SS      Reps 2  15  -SS      Additional Comments  2#  -SS         Exercise 3    Exercise Name 3  Supine DB chest press  -SS      Cueing 3  Verbal  -SS      Reps 3  8, 2x10  -SS      Additional Comments  3#, 2#  -SS         Exercise 4    Exercise Name 4  R sidelying DB shoulder abduction  -SS      Cueing 4  Verbal  -SS      Sets 4  2  -SS      Reps 4  10  -SS      Additional Comments  1#  -SS         Exercise 5    Exercise Name 5  Seated shoulder flexion  -SS      Cueing 5  Verbal  -SS      Sets 5  2  -SS      Reps 5  8  -SS      Additional Comments  1#  -SS         Exercise 6    Exercise Name 6  DB biceps curl  -SS      Cueing 6  Verbal;Demo  -SS      Sets 6  2  -SS      Reps 6  10  -SS      Additional Comments  3#   -SS        User Key  (r) = Recorded By, (t) = Taken By, (c) = Cosigned By    Initials Name Provider Type    SS Eric Hein, PT DPT Physical Therapist             Time Calculation:     Start Time: 1355  Stop Time: 1428  Time Calculation (min): 33 min  Total Timed Code Minutes- PT: 33 minute(s)     Therapy Charges for Today     Code Description Service Date Service Provider Modifiers Qty    [id number]  PT THER PROC EA 15 MIN 10/28/2019 Eric Hein, PT DPT GP 2                    Eric Hein, PT, DPT, CHT  10/28/2019

## 2019-11-05 ENCOUNTER — HOSPITAL ENCOUNTER (OUTPATIENT)
Dept: PHYSICAL THERAPY | Facility: HOSPITAL | Age: 59
Setting detail: THERAPIES SERIES
Discharge: HOME | End: 2019-11-05

## 2019-11-05 DIAGNOSIS — M25.512 LEFT SHOULDER PAIN, UNSPECIFIED CHRONICITY: Primary | ICD-10-CM

## 2019-11-05 DIAGNOSIS — S42.112A CLOSED DISPLACED FRACTURE OF BODY OF LEFT SCAPULA, INITIAL ENCOUNTER: ICD-10-CM

## 2019-11-05 PROCEDURE — 97110 THERAPEUTIC EXERCISES: CPT
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
## 2019-11-06 ENCOUNTER — OFFICE VISIT (OUTPATIENT)
Dept: ORTHOPEDIC SURGERY | Facility: CLINIC | Age: 59
End: 2019-11-06

## 2019-11-06 VITALS — HEIGHT: 68 IN | WEIGHT: 210 LBS | BODY MASS INDEX: 31.83 KG/M2

## 2019-11-06 DIAGNOSIS — S42.112D CLOSED DISPLACED FRACTURE OF BODY OF LEFT SCAPULA WITH ROUTINE HEALING, SUBSEQUENT ENCOUNTER: Primary | ICD-10-CM

## 2019-11-06 PROCEDURE — 99024 POSTOP FOLLOW-UP VISIT: CPT | Performed by: ORTHOPAEDIC SURGERY

## 2019-11-06 NOTE — PROGRESS NOTES
"The patient is a 59 y.o. male who presents for followup.    Chief Complaint   Patient presents with   • Left Shoulder - Follow-up, Pain       HPI:follow up left shoulder Pain scale today 3/10.  Overall is better his range of motion is better than it was before it stronger than it had been.  He does have some pain of the sternoclavicular joint on the left.      Current Outpatient Medications:   •  cefdinir (OMNICEF) 300 MG capsule, Take 1 capsule by mouth 2 (Two) Times a Day. Take with food and probiotics and call office and stop if develop watery diarrhea., Disp: 20 capsule, Rfl: 0  •  [START ON 11/27/2106] ipratropium-albuterol (DUO-NEB) 0.5-2.5 mg/mL nebulizer, Take 3 mL by nebulization Every 4 (Four) Hours As Needed for wheezing., Disp: , Rfl:   •  HYDROcodone-acetaminophen (NORCO) 7.5-325 MG per tablet, Take 1 tablet by mouth Every 4 (Four) Hours As Needed for Moderate Pain **Hold if too drowsy**, Disp: 20 tablet, Rfl: 0    No Known Allergies    Review of Systems   Musculoskeletal:        Left shoulder pain   All other systems reviewed and are negative.          PHYSICAL EXAM:      Vitals:    11/06/19 1108   Weight: 95.3 kg (210 lb)   Height: 172.7 cm (68\")       GAIT:     [x]  Normal  []  Antalgic    Assistive device: [x]  None  []  Walker     []  Crutches  []  Cane     []  Wheelchair  []  Stretcher    Patient is awake and alert, answers questions appropriately, and is in no apparent distress.    Range of motion is good abduction to 90 degrees forward flexion mild tenderness of the sternoclavicular joint with mild crepitus    No results found.    ASSESSMENT:  Diagnoses and all orders for this visit:    Closed displaced fracture of body of left scapula with routine healing, subsequent encounter        PLAN: At this point doing well.  He is feels actually better than he was continue range of motion therapy he will call with any problems we will see him otherwise as needed.  I told him that if he is still sore the " next 2 to 3 months we may consider an injection of the sternoclavicular joint.  And explained to him there is increased stresses on that and he probably has some pre-existing arthritic change that squats bothering work on the range of motion of his left shoulder.    No Follow-up on file.      This document has been electronically signed by Pete Muñoz MD on November 6, 2019 1:30 PM

## 2019-11-07 ENCOUNTER — APPOINTMENT (OUTPATIENT)
Dept: PHYSICAL THERAPY | Facility: HOSPITAL | Age: 59
End: 2019-11-07

## 2019-11-13 ENCOUNTER — APPOINTMENT (OUTPATIENT)
Dept: PHYSICAL THERAPY | Facility: HOSPITAL | Age: 59
End: 2019-11-13

## 2019-11-15 ENCOUNTER — OFFICE VISIT (OUTPATIENT)
Dept: OTOLARYNGOLOGY | Facility: CLINIC | Age: 59
End: 2019-11-15

## 2019-11-15 VITALS — TEMPERATURE: 97.4 F | WEIGHT: 219.4 LBS | HEIGHT: 68 IN | BODY MASS INDEX: 33.25 KG/M2

## 2019-11-15 DIAGNOSIS — Z09 POSTOP CHECK: Primary | ICD-10-CM

## 2019-11-15 PROCEDURE — 99024 POSTOP FOLLOW-UP VISIT: CPT | Performed by: OTOLARYNGOLOGY

## 2019-11-15 NOTE — PATIENT INSTRUCTIONS

## 2019-11-15 NOTE — PROGRESS NOTES
Patient doing well postop he has minimal soreness feeling much better.  He notes improvement is rating from  His preop state  Exam reveals old sutures which were trimmed tolerated well there are no evidence of infection or complication.  Suggested follow-up in 4 weeks suggested continue irrigation at least once a day    Call if any question problems otherwise and please is improved and happy with his surgical improvement

## 2019-12-16 ENCOUNTER — OFFICE VISIT (OUTPATIENT)
Dept: OTOLARYNGOLOGY | Facility: CLINIC | Age: 59
End: 2019-12-16

## 2019-12-16 VITALS — HEIGHT: 68 IN | TEMPERATURE: 97.5 F | BODY MASS INDEX: 34.65 KG/M2 | WEIGHT: 228.6 LBS

## 2019-12-16 DIAGNOSIS — Z09 POSTOP CHECK: Primary | ICD-10-CM

## 2019-12-16 PROCEDURE — 99024 POSTOP FOLLOW-UP VISIT: CPT | Performed by: OTOLARYNGOLOGY

## 2019-12-16 NOTE — PROGRESS NOTES
Patient's nose a big improvement is breathing it is some crusting is not been using a rinse regularly talked at length about how the wintertime specially support he is remains  1 year postop.  He is to use it once a day.  He is overall doing well though remove some the old sutures and we will see him back in follow-up his convenience in 2-2 and half months he is to call if any questions or problems the meantime

## 2019-12-16 NOTE — PATIENT INSTRUCTIONS

## 2020-04-06 ENCOUNTER — OFFICE VISIT (OUTPATIENT)
Dept: OTOLARYNGOLOGY | Facility: CLINIC | Age: 60
End: 2020-04-06

## 2020-04-06 VITALS — HEIGHT: 67 IN | WEIGHT: 231 LBS | BODY MASS INDEX: 36.26 KG/M2

## 2020-04-06 DIAGNOSIS — J30.1 SEASONAL ALLERGIC RHINITIS DUE TO POLLEN: Primary | ICD-10-CM

## 2020-04-06 PROCEDURE — 99212 OFFICE O/P EST SF 10 MIN: CPT | Performed by: OTOLARYNGOLOGY

## 2020-04-06 RX ORDER — AZELASTINE 1 MG/ML
2 SPRAY, METERED NASAL 2 TIMES DAILY
Qty: 30 ML | Refills: 6 | Status: SHIPPED | OUTPATIENT
Start: 2020-04-06

## 2020-04-06 RX ORDER — FLUTICASONE PROPIONATE 50 MCG
2 SPRAY, SUSPENSION (ML) NASAL DAILY
Qty: 16 G | Refills: 5 | Status: SHIPPED | OUTPATIENT
Start: 2020-04-06

## 2020-04-06 RX ORDER — AZELASTINE 1 MG/ML
SPRAY, METERED NASAL
COMMUNITY
Start: 2020-03-18 | End: 2020-04-06 | Stop reason: SDUPTHER

## 2020-04-06 NOTE — PATIENT INSTRUCTIONS

## 2020-04-06 NOTE — PROGRESS NOTES
Subjective   Crow Moffett is a 59 y.o. male.   Follow-up nasal symptoms  This is a telephone conversation with the patient  History of Present Illness   Patient states he is doing better than he had before surgery but when he goes outside he gets some congestion.  Is not have any pain or discomfort no facial swelling and he uses his allergy spray and it does help he does not like the taste is not have any fever chills      The following portions of the patient's history were reviewed and updated as appropriate: allergies, current medications, past family history, past medical history, past social history, past surgical history and problem list.      Current Outpatient Medications:   •  [START ON 11/27/2106] ipratropium-albuterol (DUO-NEB) 0.5-2.5 mg/mL nebulizer, Take 3 mL by nebulization Every 4 (Four) Hours As Needed for wheezing., Disp: , Rfl:   •  azelastine (ASTELIN) 0.1 % nasal spray, 2 sprays into the nostril(s) as directed by provider 2 (Two) Times a Day., Disp: 30 mL, Rfl: 6  •  fluticasone (FLONASE) 50 MCG/ACT nasal spray, 2 sprays into the nostril(s) as directed by provider Daily., Disp: 16 g, Rfl: 5  •  HYDROcodone-acetaminophen (NORCO) 7.5-325 MG per tablet, Take 1 tablet by mouth Every 4 (Four) Hours As Needed for Moderate Pain **Hold if too drowsy**, Disp: 20 tablet, Rfl: 0    No Known Allergies          Review of Systems   Constitutional: Negative for fever.   HENT: Positive for congestion. Negative for sinus pain.    Hematological: Negative for adenopathy.   All other systems reviewed and are negative.          Objective   Physical Exam   Nursing note and vitals reviewed.  Unable to examine over the phone patient describes physical exam        Assessment/Plan   Crow was seen today for 4 month clinical appointment.    Diagnoses and all orders for this visit:    Seasonal allergic rhinitis due to pollen    Other orders  -     azelastine (ASTELIN) 0.1 % nasal spray; 2 sprays into the  nostril(s) as directed by provider 2 (Two) Times a Day.  -     fluticasone (FLONASE) 50 MCG/ACT nasal spray; 2 sprays into the nostril(s) as directed by provider Daily.    Plan to add Flonase and if that works well he can discontinue the Astelin  He is to call us back in the next 2 to 3 weeks let me know he is doing otherwise we will see him as needed overall he is doing well feels like he is doing better since he is had surgery just concerned with allergy symptoms will give us some feedback on that over time    7 minutes was spent speaking to the patient and collecting information

## 2023-08-30 ENCOUNTER — OFFICE VISIT (OUTPATIENT)
Dept: OTOLARYNGOLOGY | Facility: CLINIC | Age: 63
End: 2023-08-30
Payer: MEDICARE

## 2023-08-30 VITALS — OXYGEN SATURATION: 98 % | BODY MASS INDEX: 36.73 KG/M2 | HEART RATE: 95 BPM | WEIGHT: 234 LBS | HEIGHT: 67 IN

## 2023-08-30 DIAGNOSIS — M95.0 NASAL VALVE COLLAPSE: ICD-10-CM

## 2023-08-30 DIAGNOSIS — J34.89 NASAL OBSTRUCTION: Primary | ICD-10-CM

## 2023-08-30 DIAGNOSIS — J30.2 SEASONAL ALLERGIC RHINITIS, UNSPECIFIED TRIGGER: ICD-10-CM

## 2023-08-30 RX ORDER — FLUTICASONE PROPIONATE 50 MCG
2 SPRAY, SUSPENSION (ML) NASAL DAILY
Qty: 16 G | Refills: 5 | Status: SHIPPED | OUTPATIENT
Start: 2023-08-30

## 2023-08-30 RX ORDER — TAMSULOSIN HYDROCHLORIDE 0.4 MG/1
CAPSULE ORAL
COMMUNITY
Start: 2023-07-11

## 2023-08-30 RX ORDER — AZELASTINE 1 MG/ML
2 SPRAY, METERED NASAL 2 TIMES DAILY
Qty: 30 ML | Refills: 6 | Status: SHIPPED | OUTPATIENT
Start: 2023-08-30
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
## 2025-01-31 NOTE — PROGRESS NOTES
Refilled pain medication today, covering for ortho MD while out of the office.    Patient awaiting CT scan of shoulder after fall.      08/19/19 at 1:31 PM by PA Don      
bilat wrists immobilized. Left fingers WNL, right fingers + edema and decreased AROM/bilateral lower extremity Active ROM was WNL (within normal limits)/deficits as listed below

## (undated) DEVICE — CONTAINER,SPECIMEN,OR STERILE,4OZ: Brand: MEDLINE

## (undated) DEVICE — TRY IRR

## (undated) DEVICE — POSTN HD RING CUSH 9IN LF

## (undated) DEVICE — SUT ETHLN 4/0 FS2 18IN 662G

## (undated) DEVICE — GLV SURG SENSICARE POLYISPRN W/ALOE PF LF 6 GRN STRL

## (undated) DEVICE — SOL IRR NACL 0.9PCT BT 1000ML

## (undated) DEVICE — CODMAN® SURGICAL PATTIES 1/2" X 3" (1.27CM X 7.62CM): Brand: CODMAN®

## (undated) DEVICE — SUT PLAIN 1 4/0 1828H

## (undated) DEVICE — GLV SURG TRIUMPH LT PF LTX 7.5 STRL

## (undated) DEVICE — Device

## (undated) DEVICE — TP SXN YANKR BLB TIP W/TBG 10F LF STRL

## (undated) DEVICE — BLD BIPOL DIEGO SMR STR STD TYPE A 2MM

## (undated) DEVICE — 9165 UNIVERSAL PATIENT PLATE: Brand: 3M™

## (undated) DEVICE — STERILE POLYISOPRENE POWDER-FREE SURGICAL GLOVES WITH EMOLLIENT COATING: Brand: PROTEXIS

## (undated) DEVICE — SPLNT NASL AIRWY SIL STRL

## (undated) DEVICE — SUT PDS 4-0 RB-1 Z304H

## (undated) DEVICE — COAGULATOR SXN HNDSWITCH 10F16IN

## (undated) DEVICE — GOWN,AURORA,NOREINF,RAGLAN,XL,STERILE: Brand: MEDLINE

## (undated) DEVICE — TBG DECLOG DIEGO ELITE MULTIDEBRIDER ST

## (undated) DEVICE — ANTI-FOG SOLUTION W/FOAM PAD: Brand: CARDINAL HEALTH

## (undated) DEVICE — NDL SPINE 25G 4 11/16 BLU

## (undated) DEVICE — GLV SURG SENSICARE PI LF PF 8 GRN STRL

## (undated) DEVICE — PK ENT LF 60